# Patient Record
Sex: FEMALE | Race: WHITE | Employment: OTHER | URBAN - METROPOLITAN AREA
[De-identification: names, ages, dates, MRNs, and addresses within clinical notes are randomized per-mention and may not be internally consistent; named-entity substitution may affect disease eponyms.]

---

## 2017-04-13 ENCOUNTER — HOSPITAL ENCOUNTER (OUTPATIENT)
Dept: MRI IMAGING | Age: 62
Discharge: HOME | End: 2017-04-13
Attending: INTERNAL MEDICINE
Payer: COMMERCIAL

## 2017-04-13 ENCOUNTER — HOSPITAL ENCOUNTER (OUTPATIENT)
Dept: MAMMOGRAPHY | Age: 62
Discharge: HOME | End: 2017-04-13
Attending: INTERNAL MEDICINE
Payer: COMMERCIAL

## 2017-04-13 VITALS — BODY MASS INDEX: 28.98 KG/M2 | WEIGHT: 185 LBS

## 2017-04-13 DIAGNOSIS — C50.912 MALIGNANT NEOPLASM OF LEFT FEMALE BREAST, UNSPECIFIED SITE OF BREAST: ICD-10-CM

## 2017-04-13 LAB — CREAT BLD-MCNC: 0.9 MG/DL (ref 0.6–1)

## 2017-04-13 PROCEDURE — A9577 INJ MULTIHANCE: HCPCS | Performed by: INTERNAL MEDICINE

## 2017-04-13 PROCEDURE — 77059 MRI BREAST BI W WO CONT: CPT

## 2017-04-13 PROCEDURE — 74011250636 HC RX REV CODE- 250/636: Performed by: INTERNAL MEDICINE

## 2017-04-13 PROCEDURE — 82565 ASSAY OF CREATININE: CPT

## 2017-04-13 PROCEDURE — 74011000258 HC RX REV CODE- 258: Performed by: INTERNAL MEDICINE

## 2017-04-13 PROCEDURE — 77067 SCR MAMMO BI INCL CAD: CPT

## 2017-04-13 RX ORDER — SODIUM CHLORIDE 0.9 % (FLUSH) 0.9 %
10 SYRINGE (ML) INJECTION
Status: COMPLETED | OUTPATIENT
Start: 2017-04-13 | End: 2017-04-13

## 2017-04-13 RX ADMIN — SODIUM CHLORIDE 100 ML: 900 INJECTION, SOLUTION INTRAVENOUS at 08:35

## 2017-04-13 RX ADMIN — Medication 10 ML: at 08:35

## 2017-04-13 RX ADMIN — GADOBENATE DIMEGLUMINE 17 ML: 529 INJECTION, SOLUTION INTRAVENOUS at 08:35

## 2017-08-08 ENCOUNTER — HOSPITAL ENCOUNTER (OUTPATIENT)
Dept: LAB | Age: 62
Discharge: HOME | End: 2017-08-08
Payer: COMMERCIAL

## 2017-08-08 DIAGNOSIS — C50.912 MALIGNANT NEOPLASM OF LEFT FEMALE BREAST, UNSPECIFIED SITE OF BREAST: Chronic | ICD-10-CM

## 2017-08-08 LAB
ALBUMIN SERPL BCP-MCNC: 3.7 G/DL (ref 3.2–4.6)
ALBUMIN/GLOB SERPL: 0.9 {RATIO} (ref 1.2–3.5)
ALP SERPL-CCNC: 97 U/L (ref 50–136)
ALT SERPL-CCNC: 24 U/L (ref 12–65)
ANION GAP BLD CALC-SCNC: 5 MMOL/L (ref 7–16)
AST SERPL W P-5'-P-CCNC: 26 U/L (ref 15–37)
BASOPHILS # BLD AUTO: 0.1 K/UL (ref 0–0.2)
BASOPHILS # BLD: 1 % (ref 0–2)
BILIRUB SERPL-MCNC: 0.5 MG/DL (ref 0.2–1.1)
BUN SERPL-MCNC: 27 MG/DL (ref 8–23)
CALCIUM SERPL-MCNC: 8.9 MG/DL (ref 8.3–10.4)
CHLORIDE SERPL-SCNC: 107 MMOL/L (ref 98–107)
CO2 SERPL-SCNC: 23 MMOL/L (ref 21–32)
CREAT SERPL-MCNC: 0.84 MG/DL (ref 0.6–1)
DIFFERENTIAL METHOD BLD: ABNORMAL
EOSINOPHIL # BLD: 0.2 K/UL (ref 0–0.8)
EOSINOPHIL NFR BLD: 3 % (ref 0.5–7.8)
ERYTHROCYTE [DISTWIDTH] IN BLOOD BY AUTOMATED COUNT: 16.2 % (ref 11.9–14.6)
GLOBULIN SER CALC-MCNC: 4 G/DL (ref 2.3–3.5)
GLUCOSE SERPL-MCNC: 104 MG/DL (ref 65–100)
HCT VFR BLD AUTO: 39 % (ref 35.8–46.3)
HGB BLD-MCNC: 12.7 G/DL (ref 11.7–15.4)
LYMPHOCYTES # BLD AUTO: 30 % (ref 13–44)
LYMPHOCYTES # BLD: 2.1 K/UL (ref 0.5–4.6)
MCH RBC QN AUTO: 25.6 PG (ref 26.1–32.9)
MCHC RBC AUTO-ENTMCNC: 32.6 G/DL (ref 31.4–35)
MCV RBC AUTO: 78.5 FL (ref 79.6–97.8)
MONOCYTES # BLD: 0.5 K/UL (ref 0.1–1.3)
MONOCYTES NFR BLD AUTO: 8 % (ref 4–12)
NEUTS SEG # BLD: 4.1 K/UL (ref 1.7–8.2)
NEUTS SEG NFR BLD AUTO: 59 % (ref 43–78)
NRBC # BLD: 0 K/UL (ref 0–0.2)
PLATELET # BLD AUTO: 331 K/UL (ref 150–450)
PMV BLD AUTO: 10.1 FL (ref 10.8–14.1)
POTASSIUM SERPL-SCNC: 4.4 MMOL/L (ref 3.5–5.1)
PROT SERPL-MCNC: 7.7 G/DL (ref 6.3–8.2)
RBC # BLD AUTO: 4.97 M/UL (ref 4.05–5.25)
SODIUM SERPL-SCNC: 135 MMOL/L (ref 136–145)
WBC # BLD AUTO: 7 K/UL (ref 4.3–11.1)

## 2017-08-08 PROCEDURE — 80053 COMPREHEN METABOLIC PANEL: CPT | Performed by: INTERNAL MEDICINE

## 2017-08-08 PROCEDURE — 36415 COLL VENOUS BLD VENIPUNCTURE: CPT | Performed by: INTERNAL MEDICINE

## 2017-08-08 PROCEDURE — 85025 COMPLETE CBC W/AUTO DIFF WBC: CPT | Performed by: INTERNAL MEDICINE

## 2018-08-16 ENCOUNTER — HOSPITAL ENCOUNTER (OUTPATIENT)
Dept: LAB | Age: 63
Discharge: HOME | End: 2018-08-16
Payer: COMMERCIAL

## 2018-08-16 DIAGNOSIS — Z85.3 HISTORY OF CANCER OF LEFT BREAST: ICD-10-CM

## 2018-08-16 LAB
ALBUMIN SERPL-MCNC: 3.5 G/DL (ref 3.2–4.6)
ALBUMIN/GLOB SERPL: 0.9 {RATIO} (ref 1.2–3.5)
ALP SERPL-CCNC: 96 U/L (ref 50–136)
ALT SERPL-CCNC: 30 U/L (ref 12–65)
ANION GAP SERPL CALC-SCNC: 10 MMOL/L (ref 7–16)
AST SERPL-CCNC: 23 U/L (ref 15–37)
BASOPHILS # BLD: 0.1 K/UL (ref 0–0.2)
BASOPHILS NFR BLD: 1 % (ref 0–2)
BILIRUB SERPL-MCNC: 0.4 MG/DL (ref 0.2–1.1)
BUN SERPL-MCNC: 17 MG/DL (ref 8–23)
CALCIUM SERPL-MCNC: 8.3 MG/DL (ref 8.3–10.4)
CHLORIDE SERPL-SCNC: 107 MMOL/L (ref 98–107)
CO2 SERPL-SCNC: 22 MMOL/L (ref 21–32)
CREAT SERPL-MCNC: 0.88 MG/DL (ref 0.6–1)
DIFFERENTIAL METHOD BLD: ABNORMAL
EOSINOPHIL # BLD: 0.2 K/UL (ref 0–0.8)
EOSINOPHIL NFR BLD: 3 % (ref 0.5–7.8)
ERYTHROCYTE [DISTWIDTH] IN BLOOD BY AUTOMATED COUNT: 16.1 % (ref 11.9–14.6)
GLOBULIN SER CALC-MCNC: 3.9 G/DL (ref 2.3–3.5)
GLUCOSE SERPL-MCNC: 106 MG/DL (ref 65–100)
HCT VFR BLD AUTO: 44.9 % (ref 35.8–46.3)
HGB BLD-MCNC: 14.4 G/DL (ref 11.7–15.4)
IMM GRANULOCYTES # BLD: 0 K/UL (ref 0–0.5)
IMM GRANULOCYTES NFR BLD AUTO: 0 % (ref 0–5)
LYMPHOCYTES # BLD: 2 K/UL (ref 0.5–4.6)
LYMPHOCYTES NFR BLD: 28 % (ref 13–44)
MCH RBC QN AUTO: 27.2 PG (ref 26.1–32.9)
MCHC RBC AUTO-ENTMCNC: 32.1 G/DL (ref 31.4–35)
MCV RBC AUTO: 84.9 FL (ref 79.6–97.8)
MONOCYTES # BLD: 0.6 K/UL (ref 0.1–1.3)
MONOCYTES NFR BLD: 8 % (ref 4–12)
NEUTS SEG # BLD: 4.3 K/UL (ref 1.7–8.2)
NEUTS SEG NFR BLD: 60 % (ref 43–78)
NRBC # BLD: 0 K/UL (ref 0–0.2)
PLATELET # BLD AUTO: 286 K/UL (ref 150–450)
PMV BLD AUTO: 10.2 FL (ref 9.4–12.3)
POTASSIUM SERPL-SCNC: 4.7 MMOL/L (ref 3.5–5.1)
PROT SERPL-MCNC: 7.4 G/DL (ref 6.3–8.2)
RBC # BLD AUTO: 5.29 M/UL (ref 4.05–5.25)
SODIUM SERPL-SCNC: 139 MMOL/L (ref 136–145)
WBC # BLD AUTO: 7.2 K/UL (ref 4.3–11.1)

## 2018-08-16 PROCEDURE — 85025 COMPLETE CBC W/AUTO DIFF WBC: CPT

## 2018-08-16 PROCEDURE — 36415 COLL VENOUS BLD VENIPUNCTURE: CPT

## 2018-08-16 PROCEDURE — 80053 COMPREHEN METABOLIC PANEL: CPT

## 2018-09-15 ENCOUNTER — HOSPITAL ENCOUNTER (OUTPATIENT)
Dept: MAMMOGRAPHY | Age: 63
Discharge: HOME | End: 2018-09-15
Attending: INTERNAL MEDICINE
Payer: COMMERCIAL

## 2018-09-15 DIAGNOSIS — Z12.31 VISIT FOR SCREENING MAMMOGRAM: ICD-10-CM

## 2018-09-15 PROCEDURE — 77063 BREAST TOMOSYNTHESIS BI: CPT

## 2019-09-25 ENCOUNTER — HOSPITAL ENCOUNTER (OUTPATIENT)
Dept: MAMMOGRAPHY | Age: 64
Discharge: HOME | End: 2019-09-25
Attending: OBSTETRICS & GYNECOLOGY
Payer: COMMERCIAL

## 2019-09-25 DIAGNOSIS — Z12.39 SCREENING FOR BREAST CANCER: ICD-10-CM

## 2019-09-25 DIAGNOSIS — Z01.419 WELL WOMAN EXAM WITH ROUTINE GYNECOLOGICAL EXAM: ICD-10-CM

## 2019-09-25 PROCEDURE — 77067 SCR MAMMO BI INCL CAD: CPT

## 2020-09-30 ENCOUNTER — HOSPITAL ENCOUNTER (OUTPATIENT)
Dept: MAMMOGRAPHY | Age: 65
Discharge: HOME | End: 2020-09-30
Attending: INTERNAL MEDICINE
Payer: COMMERCIAL

## 2020-09-30 DIAGNOSIS — Z12.31 OTHER SCREENING MAMMOGRAM: ICD-10-CM

## 2020-09-30 PROCEDURE — 77067 SCR MAMMO BI INCL CAD: CPT

## 2021-04-16 ENCOUNTER — HOSPITAL ENCOUNTER (OUTPATIENT)
Dept: LAB | Age: 66
Discharge: HOME | End: 2021-04-16
Attending: INTERNAL MEDICINE

## 2021-04-16 LAB
ALBUMIN SERPL-MCNC: 3.8 G/DL (ref 3.2–4.6)
ALBUMIN/GLOB SERPL: 1 {RATIO} (ref 1.2–3.5)
ALP SERPL-CCNC: 97 U/L (ref 50–136)
ALT SERPL-CCNC: 28 U/L (ref 12–65)
ANION GAP SERPL CALC-SCNC: 4 MMOL/L (ref 7–16)
AST SERPL-CCNC: 20 U/L (ref 15–37)
BASOPHILS # BLD: 0.1 K/UL (ref 0–0.2)
BASOPHILS NFR BLD: 1 % (ref 0–2)
BILIRUB SERPL-MCNC: 0.5 MG/DL (ref 0.2–1.1)
BUN SERPL-MCNC: 19 MG/DL (ref 8–23)
CALCIUM SERPL-MCNC: 9.1 MG/DL (ref 8.3–10.4)
CHLORIDE SERPL-SCNC: 106 MMOL/L (ref 98–107)
CHOLEST SERPL-MCNC: 210 MG/DL
CO2 SERPL-SCNC: 26 MMOL/L (ref 21–32)
CREAT SERPL-MCNC: 0.64 MG/DL (ref 0.6–1)
DIFFERENTIAL METHOD BLD: NORMAL
EOSINOPHIL # BLD: 0.2 K/UL (ref 0–0.8)
EOSINOPHIL NFR BLD: 3 % (ref 0.5–7.8)
ERYTHROCYTE [DISTWIDTH] IN BLOOD BY AUTOMATED COUNT: 13.9 % (ref 11.9–14.6)
GLOBULIN SER CALC-MCNC: 3.9 G/DL (ref 2.3–3.5)
GLUCOSE SERPL-MCNC: 104 MG/DL (ref 65–100)
HCT VFR BLD AUTO: 45.1 % (ref 35.8–46.3)
HDLC SERPL-MCNC: 58 MG/DL (ref 40–60)
HDLC SERPL: 3.6 {RATIO}
HGB BLD-MCNC: 14.6 G/DL (ref 11.7–15.4)
IMM GRANULOCYTES # BLD AUTO: 0 K/UL (ref 0–0.5)
IMM GRANULOCYTES NFR BLD AUTO: 0 % (ref 0–5)
LDLC SERPL CALC-MCNC: 129 MG/DL
LIPID PROFILE,FLP: ABNORMAL
LYMPHOCYTES # BLD: 2.7 K/UL (ref 0.5–4.6)
LYMPHOCYTES NFR BLD: 36 % (ref 13–44)
MCH RBC QN AUTO: 28.2 PG (ref 26.1–32.9)
MCHC RBC AUTO-ENTMCNC: 32.4 G/DL (ref 31.4–35)
MCV RBC AUTO: 87.2 FL (ref 79.6–97.8)
MONOCYTES # BLD: 0.6 K/UL (ref 0.1–1.3)
MONOCYTES NFR BLD: 8 % (ref 4–12)
NEUTS SEG # BLD: 3.9 K/UL (ref 1.7–8.2)
NEUTS SEG NFR BLD: 52 % (ref 43–78)
NRBC # BLD: 0 K/UL (ref 0–0.2)
PLATELET # BLD AUTO: 320 K/UL (ref 150–450)
PMV BLD AUTO: 10.6 FL (ref 9.4–12.3)
POTASSIUM SERPL-SCNC: 4.4 MMOL/L (ref 3.5–5.1)
PROT SERPL-MCNC: 7.7 G/DL (ref 6.3–8.2)
RBC # BLD AUTO: 5.17 M/UL (ref 4.05–5.2)
SODIUM SERPL-SCNC: 136 MMOL/L (ref 136–145)
TRIGL SERPL-MCNC: 115 MG/DL (ref 35–150)
VLDLC SERPL CALC-MCNC: 23 MG/DL (ref 6–23)
WBC # BLD AUTO: 7.6 K/UL (ref 4.3–11.1)

## 2021-04-16 PROCEDURE — 80053 COMPREHEN METABOLIC PANEL: CPT

## 2021-04-16 PROCEDURE — 80061 LIPID PANEL: CPT

## 2021-04-16 PROCEDURE — 85025 COMPLETE CBC W/AUTO DIFF WBC: CPT

## 2021-04-16 PROCEDURE — 36415 COLL VENOUS BLD VENIPUNCTURE: CPT

## 2021-10-02 ENCOUNTER — HOSPITAL ENCOUNTER (OUTPATIENT)
Dept: MAMMOGRAPHY | Age: 66
Discharge: HOME | End: 2021-10-02
Attending: OBSTETRICS & GYNECOLOGY
Payer: MEDICARE

## 2021-10-02 DIAGNOSIS — Z12.31 VISIT FOR SCREENING MAMMOGRAM: ICD-10-CM

## 2021-10-02 PROCEDURE — 77067 SCR MAMMO BI INCL CAD: CPT

## 2022-02-16 ENCOUNTER — HOSPITAL ENCOUNTER (OUTPATIENT)
Dept: PHYSICAL THERAPY | Age: 67
Discharge: HOME | End: 2022-02-16
Attending: PHYSICIAN ASSISTANT
Payer: MEDICARE

## 2022-02-16 DIAGNOSIS — M25.511 RIGHT SHOULDER PAIN, UNSPECIFIED CHRONICITY: ICD-10-CM

## 2022-02-16 DIAGNOSIS — M19.011 PRIMARY OSTEOARTHRITIS OF RIGHT SHOULDER: ICD-10-CM

## 2022-02-16 PROCEDURE — 97110 THERAPEUTIC EXERCISES: CPT

## 2022-02-16 PROCEDURE — 97162 PT EVAL MOD COMPLEX 30 MIN: CPT

## 2022-02-16 PROCEDURE — 97140 MANUAL THERAPY 1/> REGIONS: CPT

## 2022-02-16 NOTE — PROGRESS NOTES
Regina Dominguez Sharda  : 1955  Primary: Glenroy Carranza Medicare Advantage  Secondary:  [address]  at [place] [id number] [facility], [address] [place] [address],[address] [place] [address].  [phone]   [phone]         OUTPATIENT PHYSICAL THERAPY: Daily Treatment Note 2022  Visit Count:  1    ICD-10: Treatment Diagnosis: Pain in right shoulder (M25.511)  Precautions/Allergies:   Ciprofloxacin, Levaquin [levofloxacin], and Pcn [penicillins]   TREATMENT PLAN:  Effective Dates: 2022 TO 2022 (90 days). Frequency/Duration: 2 times a week for 90 Day(s)    Pre-treatment Symptoms/Complaints:  R shoulder pain, weakness and decreased ROM  Pain: Initial:   0-1/10 Post Session:  0-1/10   Medications Last Reviewed:  2022  Updated Objective Findings:  See evaluation note from today  TREATMENT:     THERAPEUTIC EXERCISE: (15 minutes):  Exercises per grid below to improve mobility and strength. Required minimal verbal cues to promote proper body alignment and promote proper body posture. Progressed resistance and repetitions as indicated. MANUAL THERAPY: (10 minutes): PROM R shoulder into flexion, abduction, internal rotation and external rotation was utilized and necessary because of the patient's restricted joint motion. Date:  22 Date:   Date:     Activity/Exercise Parameters Parameters Parameters   Wand Flexion in Supine 10 reps  5 sec holds     Wand External Rotation in Supine 10 reps  5 sec holds     Pendulum Exercises (Circles CW/CCW) 20 reps each     Shoulder Pulley into Flexion 20 reps  5 sec holds                           [employer] Portal  Treatment/Session Summary:    · Response to Treatment:  Pt tolerated all treatments well today with min c/o R shoulder pain.   · Communication/Consultation:  None today  · Equipment provided today:  None today  · Recommendations/Intent for next treatment session: Next visit will focus on progression of ROM/strengthening R shoulder as tolerable.     Total Treatment Billable Duration:  25 minutes  PT Patient Time In/Time Out  Time In: 1345  Time Out: 200 [facility], PT   Visit # Therapist initials Date Arrived NS/ Cx < 24 hr >24 hr Cx Visit Comments   1  02-16-22 X   Initial Assessment and Treatment                                                                                                                                                              Abbreviations: NS = No Show; CX = cancelled      Future Appointments   Date Time Provider Charity Tracey   2/22/2022  8:00 AM Cj Palm, PTA [facility]   2/24/2022  8:00 AM Cj Palm, PTA [facility]   3/1/2022  8:00 AM Cj Palm, PTA [facility]   3/3/2022  8:00 AM Ayaka Chimes, PT [facility]   3/8/2022  8:00 AM Cj Palm, PTA [facility]   3/10/2022  8:00 AM Cj Palm, PTA [facility]   3/15/2022  8:00 AM Cj Palm, PTA [facility]   3/17/2022  8:00 AM Ayaka Chimes, PT [facility]   4/21/2022  8:10 AM MAT LAB YANETH MAT [facility]   5/3/2022  8:20 AM MD YANETH Barr [facility]

## 2022-02-16 NOTE — THERAPY EVALUATION
Regina Robin  : 1955  Primary: Glenroy Carranza Medicare Advantage  Secondary:  [address] at [place] [address] [facility], [address] [place] [address],[address], [place] [address].  [phone]   [phone]           Jordi Pugh Assessment 2022   ICD-10: Treatment Diagnosis: Pain in right shoulder (M25.511)  Precautions/Allergies:   Ciprofloxacin, Levaquin [levofloxacin], and Pcn [penicillins]   TREATMENT PLAN:  Effective Dates: 2022 TO 2022 (90 days). Frequency/Duration: 2 times a week for 90 Day(s) MEDICAL/REFERRING DIAGNOSIS:  Right shoulder pain, unspecified chronicity [M25.511]  Primary osteoarthritis of right shoulder [M19.011]   DATE OF ONSET: Chronic R shoulder pain  REFERRING PHYSICIAN: DONATO Diego MD Orders: Evaluate and Treat  Return MD Appointment: Pt does not have a follow-up appt at this time     INITIAL ASSESSMENT:  Ms. Alberto Robin presents with decreased R shoulder ROM, decreased R shoulder strength and increased pain leading to decreased functional status. Pt would benefit from skilled physical therapy services to address the above deficits and help patient return to prior level of function. PROBLEM LIST (Impacting functional limitations):  1. Decreased Strength  2. Decreased ADL/Functional Activities  3. Increased Pain  4. Decreased Flexibility/Joint Mobility  5. Decreased [place] with Home Exercise Program INTERVENTIONS PLANNED: (Treatment may consist of any combination of the following)  1. Cold  2. Cryotherapy  3. Electrical Stimulation  4. Heat  5. Home Exercise Program (HEP)  6. Manual Therapy  7. Range of Motion (ROM)  8. Therapeutic Exercise/Strengthening  9. Ultrasound (US)     GOALS: (Goals have been discussed and agreed upon with patient.)  Short-Term Functional Goals: Time Frame: 4 weeks  1.  Pt will increase ROM R shoulder flexion = 140 degrees, abduction = 140 degrees, internal rotation = 50 degrees, external rotation = 60 degrees to assist with daily activities  2. Pt will increase strength R shoulder 4-/5 to assist with daily activities  3. Pt will be independent with HEP  Discharge Goals: Time Frame: 12 weeks  1. Pt will increase ROM R shoulder flexion = 160 degrees, abduction = 160 degrees, internal rotation = 70 degrees, external rotation = 80 degrees to assist with daily activities  2. Pt will increase strength R shoulder 4/5 to assist with daily activities  3. Pt will sleep 6 hours without awakening due to R shoulder pain    OUTCOME MEASURE:   Tool Used: Disabilities of the Arm, Shoulder and Hand (DASH) Questionnaire - Quick Version  Score:  Initial: 18/55  Most Recent: X/55 (Date: -- )   Interpretation of Score: The DASH is designed to measure the activities of daily living in person's with upper extremity dysfunction or pain. Each section is scored on a 1-5 scale, 5 representing the greatest disability. The scores of each section are added together for a total score of 55. MEDICAL NECESSITY:   · Patient is expected to demonstrate progress in strength, range of motion and functional technique to increase independence with daily activities. · Patient demonstrates good rehab potential due to higher previous functional level. REASON FOR SERVICES/OTHER COMMENTS:  · Patient continues to require skilled intervention due to decreased ROM/strength R shoulder with increased pain leading to decreased functional status. Total Duration:  PT Patient Time In/Time Out  Time In: 1345  Time Out: 1430    Rehabilitation Potential For Stated Goals: Good  Regarding Regina Gross's therapy, I certify that the treatment plan above will be carried out by a therapist or under their direction.   Thank you for this referral,  Bennie Martell, PT     Referring Physician Signature: DONATO Connors _______________________________ Date _____________      PAIN/SUBJECTIVE:   Initial:   0-1/10 Post Session:  0-1/10   HISTORY: History of Injury/Illness (Reason for Referral):  Pt reports insidious onset R shoulder pain since college. She states she tore a tendon in her shoulder when swimming. She states she did not have any surgery. Pt states she had a R frozen shoulder years ago also. Pt had x-rays of her R shoulder which revealed arthritis. Pt states MD did not mention shoulder replacement surgery at this time. She reports difficulties reaching overhead and behind her back. She rates her current R shoulder pain 0-1/10 sitting at rest, increasing to 3-4/10 at worst over the past week. Aggravating factors include use of R UE and sleeping on her R side. Relieving factors include heat and IcyHot. She is R-handed. She denies any neck pain or UE numbness/tingling. Pt has not had any treatment thus far for her R shoulder. Pt works in finances/insurance (mostly desk job). Pt states she has had some acupuncture recently with some benefit in ROM. Pt meets with a  3 times a week. Past Medical History/Comorbidities:   Ms. Andrez Davila  has a past medical history of Aphagia, Arthritis, Breast cancer ([address] Cancer ([address] (3/10), Chemotherapy convalescence or palliative care, GERD (gastroesophageal reflux disease), Hypertension, Radiation therapy complication, S/P prosthetic total arthroplasty of the hip, Thromboembolus ([address] and Thyroid disease. She has no past medical history of Adverse effect of anesthesia, Aneurysm ([address] Coagulation defects, COPD, Difficult intubation, Heart failure ([address] Malignant hyperthermia due to anesthesia, Morbid obesity ([address] Nausea & vomiting, Other ill-defined conditions(799.89), Pseudocholinesterase deficiency, Psychiatric disorder, Unspecified adverse effect of anesthesia, or Unspecified sleep apnea. Ms. Andrez Davila  has a past surgical history that includes hx vascular access (2012); hx heent (3/2011); hx laparotomy (1993); vascular access placement (7/2011); hx dilation and curettage (2009); hx heart catheterization (5/28/13); hx breast lumpectomy (4/2011); hx cholecystectomy (6/18/13); hx hip replacement (12/2015); and hx hip replacement (12/13/2011). Social History/Living Environment:     Pt currently having difficulties with heavier household ADL's and fastening her bra due to decreased ROM R shoulder  Prior Level of Function/Work/Activity:  Pt works in finances/insurance  Dominant Side:         RIGHT  Other Clinical Tests:          X-rays R shoulder revealed arthritis  Personal Factors:          Sex:  female        Age:  73 y.o. Profession:  Pt works in finances/insurance   Ambulatory/Rehab [address] Assessment   Risk Factors:       No Risk Factors Identified Ability to Rise from Chair:       (0)  Ability to rise in a single movement   Falls Prevention Plan:       No modifications necessary   Total: (5 or greater = High Risk): 0   ©2010 [facility] of [facility] . [facility] States Patent #[id number]. Federal Law prohibits the replication, distribution or use without written permission from [facility] of [employer]   Current Medications:       Current Outpatient Medications:     diclofenac (VOLTAREN) 1 % gel, Apply  to affected area four (4) times daily. , Disp: 100 g, Rfl: 0    liothyronine (CYTOMEL) 5 mcg tablet, Take 5 mcg by mouth daily. , Disp: , Rfl:     LORazepam (ATIVAN) 0.5 mg tablet, Take 1 Tablet by mouth every six (6) hours as needed for Anxiety. , Disp: 60 Tablet, Rfl: 5    levothyroxine (Synthroid) 75 mcg tablet, Take 1 Tablet by mouth Daily (before breakfast). , Disp: 90 Tablet, Rfl: 3    OTHER,NON-FORMULARY,, T3 5 mcg daily, Disp: 90 Each, Rfl: 3    OTHER, daily. Tumeric 900 mg cap, Disp: , Rfl:     omega-3 fatty acids-vitamin e 1,000 mg cap, Take 1 Cap by mouth daily. , Disp: , Rfl:     CALCIUM CARBONATE/VITAMIN D3 (CALCIUM 600 + D,3, PO), Take 1 Tab by mouth two (2) times a day., Disp: , Rfl:     diphenhydrAMINE (BENADRYL) 25 mg capsule, Take 50 mg by mouth nightly as needed. , Disp: , Rfl:    Date Last Reviewed:  02-16-22   Number of Personal Factors/Comorbidities that affect the Plan of Care: 1-2: MODERATE COMPLEXITY   EXAMINATION:   Observation/Orthostatic Postural Assessment: Forward head, rounded shoulders  Palpation:          Tenderness R anterior/lateral deltoid, bicep tendon, subscapularis and lats  ROM:    R shoulder flexion = 120 degrees  R shoulder abduction = 120 degrees  R shoulder internal rotation = 30 degrees  R shoulder external rotation = 40 degrees    L shoulder flexion = 170 degrees  L shoulder abduction = 170 degrees  L shoulder internal rotation = 80 degrees  L shoulder external rotation = 90 degrees    Strength:    R shoulder flexion = 3+/5  R shoulder abduction = 3+/5  R shoulder internal rotation = 4-/5  R shoulder external rotation = 4-/5    L shoulder flexion = 5/5  L shoulder abduction = 5/5  L shoulder internal rotation = 5/5  L shoulder external rotation = 5/5    Special Tests:          Hawkin's Segundo aggravates R shoulder symptoms  Neurological Screen:        Sensation: Intact to light touch R UE  Functional Mobility:         Transfers:  Pt able to transition sit to supine and supine to sit independently    Body Structures Involved:  1. Bones  2. Joints  3. Muscles Body Functions Affected:  1. Sensory/Pain  2. Neuromusculoskeletal Activities and Participation Affected:  1. Mobility  2.  Domestic Life   Number of elements (examined above) that affect the Plan of Care: 3: MODERATE COMPLEXITY   CLINICAL PRESENTATION:   Presentation: Evolving clinical presentation with changing clinical characteristics: MODERATE COMPLEXITY   CLINICAL DECISION MAKING:   Use of outcome tool(s) and clinical judgement create a POC that gives a: Questionable prediction of patient's progress: MODERATE COMPLEXITY

## 2022-02-22 ENCOUNTER — HOSPITAL ENCOUNTER (OUTPATIENT)
Dept: PHYSICAL THERAPY | Age: 67
Discharge: HOME | End: 2022-02-22
Attending: PHYSICIAN ASSISTANT
Payer: MEDICARE

## 2022-02-22 PROCEDURE — 97140 MANUAL THERAPY 1/> REGIONS: CPT

## 2022-02-22 PROCEDURE — 97110 THERAPEUTIC EXERCISES: CPT

## 2022-02-22 NOTE — PROGRESS NOTES
Regina WESLEY Emmanuelle Robert  : 1955  Primary: Evan Boswell Medicare Advantage  Secondary:  [address]  at [facility]  [address] [facility], [address] [place] [address],[address], [address] [facility]  [phone]   [phone]         OUTPATIENT PHYSICAL THERAPY: Daily Treatment Note 2022  Visit Count:  2    ICD-10: Treatment Diagnosis: Pain in right shoulder (M25.511)  Precautions/Allergies:   Ciprofloxacin, Levaquin [levofloxacin], and Pcn [penicillins]   TREATMENT PLAN:  Effective Dates: 2022 TO 2022 (90 days). Frequency/Duration: 2 times a week for 90 Day(s)    Pre-treatment Symptoms/Complaints:  R shoulder pain, weakness and decreased ROM  \"It fires up at times, then releases it\"  Pain: Initial:   0-1/10 Post Session:  0-1/10   Medications Last Reviewed:  2022  Updated Objective Findings:  None Today  TREATMENT:     THERAPEUTIC EXERCISE: (25 minutes):  Exercises per grid below to improve mobility and strength. Required minimal verbal cues to promote proper body alignment and promote proper body posture. Progressed resistance and repetitions as indicated. MANUAL THERAPY: (20 minutes): PROM R shoulder into flexion, abduction, internal rotation and external rotation was utilized and necessary because of the patient's restricted joint motion, loss of articular motion and restricted motion of soft tissue. Worked through Right bicep secondary to tightness and discomfort with bio-freeze.        Date:  22 Date:   Date:     Activity/Exercise Parameters Parameters Parameters   Wand Flexion in Supine 10 reps  5 sec holds     Wand External Rotation in Supine 10 reps  5 sec holds     Pendulum Exercises (Circles CW/CCW) 20 reps each     Shoulder Pulley into Flexion-Scaption 20 reps  5 sec holds     Wall Walk X 10 reps     Wand ER 15 reps 5 sec holds     UBE 6 min Level 2.0     tband ER IR Red x 15 reps     tband Rows  tband extensions Blue x 15  Green x 15      Supine wand Push Ups X 20 reps [facility] Portal  Treatment/Session Summary:    · Response to Treatment:  Pt tolerated all treatments well today with min c/o R shoulder pain. Tightness into IR/ER with end range tightness with flexion and abduction. Right bicep tightness. · Communication/Consultation:  None today  · Equipment provided today:  None today  · Recommendations/Intent for next treatment session: Next visit will focus on progression of ROM/strengthening R shoulder as tolerable.     Total Treatment Billable Duration:  45 minutes  PT Patient Time In/Time Out  Time In: 0800  Time Out: [address], PTA   Visit # Therapist initials Date Arrived NS/ Cx < 24 hr >24 hr Cx Visit Comments   1 BW 02-16-22 X   Initial Assessment and Treatment   2 rjk 2-22-22 x                                                                                                                                                        Abbreviations: NS = No Show; CX = cancelled      Future Appointments   Date Time Provider Charity Webb   2/24/2022  8:00 AM [place] Danay, PTA [facility]   3/1/2022  8:00 AM Vaibhav Danay, PTA [facility]   3/3/2022  8:00 AM Donnamaria [place], PT [facility]   3/8/2022  8:00 AM [place] Lush, PTA [facility]   3/10/2022  8:00 AM [place] Danay, PTA [facility]   3/15/2022  8:00 AM [place] Danay, PTA [facility]   3/17/2022  8:00 AM Donnamaria [place], PT [facility]   4/21/2022  8:10 AM MAT LAB [facility]   5/3/2022  8:20 AM Jessica Funez MD [facility]

## 2022-02-24 ENCOUNTER — HOSPITAL ENCOUNTER (OUTPATIENT)
Dept: PHYSICAL THERAPY | Age: 67
Discharge: HOME | End: 2022-02-24
Attending: PHYSICIAN ASSISTANT
Payer: MEDICARE

## 2022-02-24 PROCEDURE — 97110 THERAPEUTIC EXERCISES: CPT

## 2022-02-24 PROCEDURE — 97140 MANUAL THERAPY 1/> REGIONS: CPT

## 2022-02-24 NOTE — PROGRESS NOTES
Regina WESLEY Jose Melendrez  : 1955  Primary: Chelsea Kevin Medicare Advantage  Secondary:  [address]  at [place] [id number] [facility], [address] [place] [address],[address], [place] [address].  [phone]   [phone]         OUTPATIENT PHYSICAL THERAPY: Daily Treatment Note 2022  Visit Count:  3    ICD-10: Treatment Diagnosis: Pain in right shoulder (M25.511)  Precautions/Allergies:   Ciprofloxacin, Levaquin [levofloxacin], and Pcn [penicillins]   TREATMENT PLAN:  Effective Dates: 2022 TO 2022 (90 days). Frequency/Duration: 2 times a week for 90 Day(s)    Pre-treatment Symptoms/Complaints:  R shoulder pain, weakness and decreased ROM  \"I am doing ok\" \"I had accupuncture- they shoot some stuff up in that shoulder, help grow cartilage, had it done yesterday so it is sore today\"  Pain: Initial:   -3/10 Post Session:  2/10   Medications Last Reviewed:  2022  Updated Objective Findings:  None Today  TREATMENT:     THERAPEUTIC EXERCISE: (25 minutes):  Exercises per grid below to improve mobility and strength. Required minimal verbal cues to promote proper body alignment and promote proper body posture. Progressed resistance and repetitions as indicated. MANUAL THERAPY: (20 minutes): PROM R shoulder into flexion, abduction, internal rotation and external rotation was utilized and necessary because of the patient's restricted joint motion, loss of articular motion and restricted motion of soft tissue. Worked through Right bicep secondary to tightness and discomfort with bio-freeze.        Date:  22 Date:   Date:     Activity/Exercise Parameters Parameters Parameters   Wand Flexion in Supine 10 reps  5 sec holds     Wand External Rotation in Supine 15 reps  5 sec holds     Pendulum Exercises (Circles CW/CCW) 20 reps each     Shoulder Pulley into Flexion-Scaption 20 reps  5 sec holds     Standing Flexion   Standing Scaption #1 x 15  #1 x 15     Wall Walk X 10 reps     Wand ER 15 reps 5 sec holds     UBE 6 min Level 2.0     tband ER IR Red x 15 reps     tband Rows  tband extensions Blue x 15  Green x 15      Supine wand Push Ups X 20 reps      Bicep curl #10 x 20 reps         [employer] Portal  Treatment/Session Summary:    · Response to Treatment:  Pt tolerated all treatments well today with min c/o R shoulder pain. Tightness into IR/ER with end range tightness with flexion and abduction. Right bicep tightness. Patient works out 4 times a week. · Communication/Consultation:  None today  · Equipment provided today:  None today  · Recommendations/Intent for next treatment session: Next visit will focus on progression of ROM/strengthening R shoulder as tolerable.     Total Treatment Billable Duration:  45 minutes  PT Patient Time In/Time Out  Time In: 0800  Time Out: [address], PTA   Visit # Therapist initials Date Arrived NS/ Cx < 24 hr >24 hr Cx Visit Comments   1  02-16-22 X   Initial Assessment and Treatment   2 [facility] 2-22-22 x      3 [facility] 2-24-22 x                                                                                                                                               Abbreviations: NS = No Show; CX = cancelled      Future Appointments   Date Time Provider Charity Webb   2/24/2022  8:00 AM Caren Rave, PTA [facility]   3/1/2022  8:00 AM Caren Rave, PTA [facility]   3/3/2022  8:00 AM Wilfrido Lowe, PT [facility]   3/8/2022  8:00 AM Caren Rave, PTA [facility]   3/10/2022  8:00 AM Caren Rave, PTA [facility]   3/15/2022  8:00 AM Caren Rave, PTA [facility]   3/17/2022  8:00 AM Wilfrido Saucedophrey, PT [facility]   4/21/2022  8:10 AM MAT LAB [facility]   5/3/2022  8:20 AM MD YANETH Tejada [facility]

## 2022-03-01 ENCOUNTER — HOSPITAL ENCOUNTER (OUTPATIENT)
Dept: PHYSICAL THERAPY | Age: 67
Discharge: HOME | End: 2022-03-01
Attending: PHYSICIAN ASSISTANT
Payer: MEDICARE

## 2022-03-01 PROCEDURE — 97110 THERAPEUTIC EXERCISES: CPT

## 2022-03-01 PROCEDURE — 97140 MANUAL THERAPY 1/> REGIONS: CPT

## 2022-03-01 NOTE — PROGRESS NOTES
Regina WESLEY Maritza Ellis  : 1955  Primary: Ashley Price Medicare Advantage  Secondary:  [address] at [address], [address] [place] [address],[address], [place] [address].  [phone]   [phone]         OUTPATIENT PHYSICAL THERAPY: Daily Treatment Note 3/1/2022  Visit Count:  4    ICD-10: Treatment Diagnosis: Pain in right shoulder (M25.511)  Precautions/Allergies:   Ciprofloxacin, Levaquin [levofloxacin], and Pcn [penicillins]   TREATMENT PLAN:  Effective Dates: 2022 TO 2022 (90 days). Frequency/Duration: 2 times a week for 90 Day(s)    Pre-treatment Symptoms/Complaints:  R shoulder pain, weakness and decreased ROM  \"It aches when I go out into ER of my shoulders, I worked out yesterday and afterwards it was achiness\"  Pain: Initial:   -3/10 Post Session:  2/10   Medications Last Reviewed:  3/1/2022  Updated Objective Findings:  None Today  TREATMENT:     THERAPEUTIC EXERCISE: (25 minutes):  Exercises per grid below to improve mobility and strength. Required minimal verbal cues to promote proper body alignment and promote proper body posture. Progressed resistance and repetitions as indicated. MANUAL THERAPY: (20 minutes): PROM R shoulder into flexion, abduction, internal rotation and external rotation was utilized and necessary because of the patient's restricted joint motion, loss of articular motion and restricted motion of soft tissue. Worked through Right bicep secondary to tightness and discomfort with bio-freeze.        Date:  22 Date:   Date:     Activity/Exercise Parameters Parameters Parameters   Wand Flexion in Supine 10 reps  5 sec holds     Wand External Rotation in Supine 15 reps  5 sec holds     Pendulum Exercises (Circles CW/CCW) 20 reps each     Shoulder Pulley into Flexion-Scaption 20 reps  5 sec holds           Wall Walk X 10 reps     Bent over Flexion  Bent over Scaption  Bent over Extension  Bent over Rows # 1 x 15 reps  # 1 x 15 reps  # 4 x 15 reps  # 10 x 15 reps                 Wand ER 15 reps 5 sec holds     UBE 6 min Level 3.0     tband ER IR Red x 15 reps     tband Rows  tband extensions Blue x 20  blue x 20=     Supine wand Push Ups X 20 reps      Bicep curl #10 x 20 reps         [employer] Portal  Treatment/Session Summary:    · Response to Treatment:  Pt tolerated all treatments well today with min c/o R shoulder pain. Tightness into IR/ER with end range tightness with flexion and abduction. Right bicep tightness. Patient having overall Ache in her shoulder, especially into ER. · Communication/Consultation:  None today  · Equipment provided today:  None today  · Recommendations/Intent for next treatment session: Next visit will focus on progression of ROM/strengthening R shoulder as tolerable.     Total Treatment Billable Duration:  45 minutes  PT Patient Time In/Time Out  Time In: 0800  Time Out: [address], PTA   Visit # Therapist initials Date Arrived NS/ Cx < 24 hr >24 hr Cx Visit Comments   1  02-16-22 X   Initial Assessment and Treatment   2 [facility] 2-22-22 x      3 [facility] 2-24-22 x      4 [facility] 3-1-22 x                                                                                                                                      Abbreviations: NS = No Show; CX = cancelled      Future Appointments   Date Time Provider Charity Webb   3/1/2022  8:00 AM Zeb Marilyn, PTA [facility]   3/3/2022  8:00 AM Angeles Hobson, PT [facility]   3/8/2022  8:00 AM Lovina Marilyn, PTA [facility]   3/10/2022  8:00 AM Lovina Marilyn, PTA [facility]   3/14/2022  8:00 AM Lovina Marilyn, PTA [facility]   3/16/2022  8:00 AM Lovina Marilyn, PTA [facility]   4/21/2022  8:10 AM MAT LAB [facility]   5/3/2022  8:20 AM MD YANETH Hay [facility]

## 2022-03-03 ENCOUNTER — HOSPITAL ENCOUNTER (OUTPATIENT)
Dept: PHYSICAL THERAPY | Age: 67
Discharge: HOME | End: 2022-03-03
Attending: PHYSICIAN ASSISTANT
Payer: MEDICARE

## 2022-03-03 PROCEDURE — 97110 THERAPEUTIC EXERCISES: CPT

## 2022-03-03 PROCEDURE — 97140 MANUAL THERAPY 1/> REGIONS: CPT

## 2022-03-03 NOTE — PROGRESS NOTES
Regina Banks Moris  : 1955  Primary: Myron [place] Medicare Advantage  Secondary:  [address]  at [place] [address] [facility], [address] [place] [address],[address], [address] [facility]  [phone]   [phone]         OUTPATIENT PHYSICAL THERAPY: Daily Treatment Note 3/3/2022  Visit Count:  5    ICD-10: Treatment Diagnosis: Pain in right shoulder (M25.511)  Precautions/Allergies:   Ciprofloxacin, Levaquin [levofloxacin], and Pcn [penicillins]   TREATMENT PLAN:  Effective Dates: 2022 TO 2022 (90 days). Frequency/Duration: 2 times a week for 90 Day(s)    Pre-treatment Symptoms/Complaints:  R shoulder pain, weakness and decreased ROM;  Pt states she can tell her ROM is better. Pain: Initial:   -3/10 Post Session:  2/10   Medications Last Reviewed:  3/3/2022  Updated Objective Findings:  None Today  TREATMENT:     THERAPEUTIC EXERCISE: (25 minutes):  Exercises per grid below to improve mobility and strength. Required minimal verbal cues to promote proper body alignment and promote proper body posture. Progressed resistance and repetitions as indicated. MANUAL THERAPY: (20 minutes): PROM R shoulder into flexion, abduction, internal rotation and external rotation was utilized and necessary because of the patient's restricted joint motion, loss of articular motion and restricted motion of soft tissue. Date:  22 Date:   Date:     Activity/Exercise Parameters Parameters Parameters   Wand Flexion in Supine 10 reps  5 sec holds 10 reps  5 sec holds    Wand External Rotation in Supine 15 reps  5 sec holds 10 reps  5 sec holds    Pendulum Exercises (Circles CW/CCW) 20 reps each 20 reps each    Shoulder Pulley into Flexion-Scaption 20 reps  5 sec holds 20 reps  5 sec holds    Wall Walk X 10 reps 10 reps    Bent over Flexion  Bent over Scaption  Bent over Extension  Bent over Rows # 1 x 15 reps  # 1 x 15 reps  # 4 x 15 reps  # 10 x 15 reps Bent Over Shoulder Ext  3 Lbs  20 reps;   Rows  10 Lbs  20 reps    UBE 6 min Level 3.0 Manual L3  6 minutes    tband ER IR Red x 15 reps Red T-band  20 reps each    tband Rows  tband extensions Blue x 20  blue x 20= Blue T-band  20 reps each    Supine wand Push Ups X 20 reps      Bicep curl #10 x 20 reps 10 Lbs  20 reps        MedBridge Portal  Treatment/Session Summary:    · Response to Treatment:  Pt tolerated all treatments well today with min c/o R shoulder pain. Improved ROM R shoulder today. · Communication/Consultation:  None today  · Equipment provided today:  None today  · Recommendations/Intent for next treatment session: Next visit will focus on progression of ROM/strengthening R shoulder as tolerable.     Total Treatment Billable Duration:  45 minutes  PT Patient Time In/Time Out  Time In: 0800  Time Out: [address], PT   Visit # Therapist initials Date Arrived NS/ Cx < 24 hr >24 hr Cx Visit Comments   1  02-16-22 X   Initial Assessment and Treatment   2 rjk 2-22-22 x      3 [facility] 2-24-22 x      4 rjk 3-1-22 x      5  03-03-22 X                                                                                                                             Abbreviations: NS = No Show; CX = cancelled      Future Appointments   Date Time Provider Charity Webb   3/8/2022  8:00 AM Francia Acosta, PTA [facility]   3/10/2022  8:00 AM Francia Acosta, PTA [facility]   3/14/2022  8:00 AM Francia Acosta, PTA [facility]   3/16/2022  8:00 AM Francia Acosta, PTA [facility]   4/21/2022  8:10 AM MAT LAB [facility]   5/3/2022  8:20 AM Marta Epley, MD [facility]

## 2022-03-08 ENCOUNTER — HOSPITAL ENCOUNTER (OUTPATIENT)
Dept: PHYSICAL THERAPY | Age: 67
Discharge: HOME | End: 2022-03-08
Attending: PHYSICIAN ASSISTANT
Payer: MEDICARE

## 2022-03-08 PROCEDURE — 97140 MANUAL THERAPY 1/> REGIONS: CPT

## 2022-03-08 PROCEDURE — 97110 THERAPEUTIC EXERCISES: CPT

## 2022-03-08 NOTE — PROGRESS NOTES
Regina Chávez  : 1955  Primary: Prince Tapia Medicare Advantage  Secondary:  Katy Cyrashley at [place] [address] [facility], [address] [place] [address],[address], [place] [address].  [phone]   [phone]         OUTPATIENT PHYSICAL THERAPY: Daily Treatment Note 3/8/2022  Visit Count:  6    ICD-10: Treatment Diagnosis: Pain in right shoulder (M25.511)  Precautions/Allergies:   Ciprofloxacin, Levaquin [levofloxacin], and Pcn [penicillins]   TREATMENT PLAN:  Effective Dates: 2022 TO 2022 (90 days). Frequency/Duration: 2 times a week for 90 Day(s)    Pre-treatment Symptoms/Complaints:  R shoulder pain, weakness and decreased ROM; Achiness overall, I worked out yesterday and did a bent over row, with no pain'  Pain: Initial:   -3/10 Post Session:  2/10   Medications Last Reviewed:  3/8/2022  Updated Objective Findings:  None Today  TREATMENT:     THERAPEUTIC EXERCISE: (25 minutes):  Exercises per grid below to improve mobility and strength. Required minimal verbal cues to promote proper body alignment and promote proper body posture. Progressed resistance and repetitions as indicated. MANUAL THERAPY: (20 minutes): PROM R shoulder into flexion, abduction, internal rotation and external rotation was utilized and necessary because of the patient's restricted joint motion, loss of articular motion and restricted motion of soft tissue. Date:  3-8-22 Date:     Activity/Exercise Parameters Parameters   Wand Flexion in Supine 10 reps  5 sec holds    Nautilus Rows #45 x 20 reps    Wand External Rotation in Supine 10 reps  5 sec holds    Pendulum Exercises (Circles CW/CCW) 20 reps each    Shoulder Pulley into Flexion-Scaption 20 reps  5 sec holds    Bench Press #5 x 20 reps    Wall Walk 10 reps    Bent over Flexion  Bent over Scaption  Bent over Extension #5  Bent over Rows Bent Over Shoulder Ext  3 Lbs  20 reps;   Rows  17.5 Lbs  20 reps    Standing flexion - Scaption #1 x 15 reps    UBE Manual L3  6 minutes    tband ER IR green T-band  20 reps each    tband Rows  tband extensions Black T-band  20 reps each    Supine wand Push Ups x    Bicep curl 10 Lbs  20 reps        [employer] Portal  Treatment/Session Summary:    · Response to Treatment:  Pt tolerated all treatments well today with min c/o R shoulder pain. ER and IR tightness- patient continues to work out 3 x's a week with virtual . · Communication/Consultation:  None today  · Equipment provided today:  None today  · Recommendations/Intent for next treatment session: Next visit will focus on progression of ROM/strengthening R shoulder as tolerable.     Total Treatment Billable Duration:  45 minutes  PT Patient Time In/Time Out  Time In: 0800  Time Out: [address], PTA   Visit # Therapist initials Date Arrived NS/ Cx < 24 hr >24 hr Cx Visit Comments   1 BW 02-16-22 X   Initial Assessment and Treatment   2 rjk 2-22-22 x      3 rjk 2-24-22 x      4 rjk 3-1-22 x      5 BW 03-03-22 X      6 rjk 3-8-22 x                                                                                                                    Abbreviations: NS = No Show; CX = cancelled      Future Appointments   Date Time Provider Charity Webb   3/8/2022  8:00 AM Lawayne Christ, PTA [facility]   3/10/2022  8:00 AM Lawayne Christ, PTA [facility]   3/14/2022  8:00 AM Lawayne Christ, PTA [facility]   3/16/2022  8:00 AM Lawayne Christ, PTA [facility]   4/21/2022  8:10 AM MAT LAB [facility]   5/3/2022  8:20 AM Sheldon Upton MD [facility]

## 2022-03-10 ENCOUNTER — HOSPITAL ENCOUNTER (OUTPATIENT)
Dept: PHYSICAL THERAPY | Age: 67
Discharge: HOME | End: 2022-03-10
Attending: PHYSICIAN ASSISTANT
Payer: MEDICARE

## 2022-03-10 PROCEDURE — 97140 MANUAL THERAPY 1/> REGIONS: CPT

## 2022-03-10 PROCEDURE — 97110 THERAPEUTIC EXERCISES: CPT

## 2022-03-10 NOTE — PROGRESS NOTES
Regina WESLEY Peggye Cabot  : 1955  Primary: Terrance Castanon Medicare Advantage  Secondary:  [address]  at [place] [id number] [facility], [address] [place] [address],[address], [place] [address].  [phone]   [phone]         OUTPATIENT PHYSICAL THERAPY: Daily Treatment Note 3/10/2022  Visit Count:  7    ICD-10: Treatment Diagnosis: Pain in right shoulder (M25.511)  Precautions/Allergies:   Ciprofloxacin, Levaquin [levofloxacin], and Pcn [penicillins]   TREATMENT PLAN:  Effective Dates: 2022 TO 2022 (90 days). Frequency/Duration: 2 times a week for 90 Day(s)    Pre-treatment Symptoms/Complaints:  R shoulder pain, weakness and decreased ROM;  \"Just some twinges\"  Pain: Initial:   1/10 Post Session:  2/10   Medications Last Reviewed:  3/10/2022  Updated Objective Findings:  None Today  TREATMENT:     THERAPEUTIC EXERCISE: (25 minutes):  Exercises per grid below to improve mobility and strength. Required minimal verbal cues to promote proper body alignment and promote proper body posture. Progressed resistance and repetitions as indicated. MANUAL THERAPY: (20 minutes): PROM R shoulder into flexion, abduction, internal rotation and external rotation was utilized and necessary because of the patient's restricted joint motion, loss of articular motion and restricted motion of soft tissue. Date:  3-10-22 Date:     Activity/Exercise Parameters Parameters   Wand Flexion in Supine 10 reps  5 sec holds    Nautilus Press #30 x 20 reps    Nautilus Rows #45 x 20 reps    Wand External Rotation in Supine 10 reps  5 sec holds    Pendulum Exercises (Circles CW/CCW) 20 reps each    Shoulder Pulley into Flexion-Scaption 20 reps  5 sec holds    Punch out Black x 20 reps    Bench Press #10 x 20 reps    Wall Walk 10 reps    Bent over Flexion  Bent over Scaption  Bent over Extension #5  Bent over Rows Bent Over Shoulder Ext  3 Lbs  20 reps;   Rows  17.5 Lbs  20 reps    Standing flexion - Scaption #1 x 15 reps    UBE Manual L3  6 minutes    tband ER IR blue T-band  20 reps each    tband Rows  tband extensions Black T-band  20 reps each    Supine wand Push Ups x    Bicep curl #10 Lbs  20 reps        MedBridge Portal  Treatment/Session Summary:    · Response to Treatment:  Pt tolerated all treatments well today with min c/o R shoulder pain. Patient continues to not be able to reach behind her back at this time- told her to add the towel stretch for this for HEP. Patient would benefit from continued therapy for Right UE. · Communication/Consultation:  None today  · Equipment provided today:  None today  · Recommendations/Intent for next treatment session: Next visit will focus on progression of ROM/strengthening R shoulder as tolerable.     Total Treatment Billable Duration:  45 minutes  PT Patient Time In/Time Out  Time In: 0800  Time Out: [address], PTA   Visit # Therapist initials Date Arrived NS/ Cx < 24 hr >24 hr Cx Visit Comments   1 BW 02-16-22 X   Initial Assessment and Treatment   2 rjk 2-22-22 x      3 rjk 2-24-22 x      4 rjk 3-1-22 x      5 BW 03-03-22 X      6 rjk 3-8-22 x      7 rjk 3-10-22 x                                                                                                           Abbreviations: NS = No Show; CX = cancelled      Future Appointments   Date Time Provider Charity Webb   3/10/2022  8:00 AM Gwynneth Mode, PTA [facility]   3/14/2022  8:00 AM Gwynneth Mode, PTA [facility]   3/16/2022  8:00 AM Gwynneth Mode, PTA [facility]   4/21/2022  8:10 AM MAT LAB [facility]   5/3/2022  8:20 AM MD YANETH Caballero [facility]

## 2022-03-14 ENCOUNTER — HOSPITAL ENCOUNTER (OUTPATIENT)
Dept: PHYSICAL THERAPY | Age: 67
Discharge: HOME | End: 2022-03-14
Attending: PHYSICIAN ASSISTANT
Payer: MEDICARE

## 2022-03-14 PROCEDURE — 97140 MANUAL THERAPY 1/> REGIONS: CPT

## 2022-03-14 PROCEDURE — 97110 THERAPEUTIC EXERCISES: CPT

## 2022-03-14 NOTE — PROGRESS NOTES
Regina WESLEY Valeria Tidwell  : 1955  Primary: Brinda Rodríguez Medicare Advantage  Secondary:  [address]  at [place] [id number] [facility], [address] [place] [address],[address], [place] [address].  [phone]   [phone]         OUTPATIENT PHYSICAL THERAPY: Daily Treatment Note 3/14/2022  Visit Count:  8    ICD-10: Treatment Diagnosis: Pain in right shoulder (M25.511)  Precautions/Allergies:   Ciprofloxacin, Levaquin [levofloxacin], and Pcn [penicillins]   TREATMENT PLAN:  Effective Dates: 2022 TO 2022 (90 days). Frequency/Duration: 2 times a week for 90 Day(s)    Pre-treatment Symptoms/Complaints:  R shoulder pain, weakness and decreased ROM;  \"My rotator cuff is bothering me, worked out this morning\"  Pain: Initial:   1/10 Post Session:  2/10   Medications Last Reviewed:  3/14/2022  Updated Objective Findings:  None Today  TREATMENT:     THERAPEUTIC EXERCISE: (25 minutes):  Exercises per grid below to improve mobility and strength. Required minimal verbal cues to promote proper body alignment and promote proper body posture. Progressed resistance and repetitions as indicated. MANUAL THERAPY: (20 minutes): PROM R shoulder into flexion, abduction, internal rotation and external rotation was utilized and necessary because of the patient's restricted joint motion, loss of articular motion and restricted motion of soft tissue. Date:  3-14-22 Date:     Activity/Exercise Parameters Parameters   Wand Flexion in Supine 10 reps  5 sec holds    Nautilus Press #30 x 20 reps    Nautilus Rows #45 x 20 reps    Wand External Rotation in Supine 10 reps  5 sec holds    Pendulum Exercises (Circles CW/CCW) 20 reps each    Shoulder Pulley into Flexion-Scaption 20 reps  5 sec holds    Punch out Black x 20 reps    Bench Press #10 x 20 reps    Wall Walk 10 reps    Bent over Flexion  Bent over Scaption  Bent over Extension #5  Bent over Rows Bent Over Shoulder Ext  3 Lbs  20 reps;   Rows  17.5 Lbs  20 reps    Standing flexion - Scaption #1 x 15 reps    UBE Manual L3  6 minutes    tband ER IR blue T-band  20 reps each    tband Rows  tband extensions Black T-band  20 reps each    Supine wand Push Ups x    Bicep curl #10 Lbs  20 reps        MedBridge Portal  Treatment/Session Summary:    · Response to Treatment:  Pt tolerated all treatments well today with min c/o R shoulder pain. Patient sore this morning secondary to already working out. .  · Communication/Consultation:  None today  · Equipment provided today:  None today  · Recommendations/Intent for next treatment session: Next visit will focus on progression of ROM/strengthening R shoulder as tolerable.     Total Treatment Billable Duration:  45 minutes  PT Patient Time In/Time Out  Time In: 0800  Time Out: [address], PTA   Visit # Therapist initials Date Arrived NS/ Cx < 24 hr >24 hr Cx Visit Comments   1 BW 02-16-22 X   Initial Assessment and Treatment   2 rjk 2-22-22 x      3 rjk 2-24-22 x      4 rjk 3-1-22 x      5 BW 03-03-22 X      6 rjk 3-8-22 x      7 rjk 3-10-22 x      8 rjk 3-14-22 x                                                                                                  Abbreviations: NS = No Show; CX = cancelled      Future Appointments   Date Time Provider Charity Webb   3/14/2022  8:00 AM Sushila Sharp PTA [facility]   3/16/2022  8:00 AM Sushila Sharp PTA [facility]   4/21/2022  8:10 AM MAT LAB YANETH SALDAÑA [facility]   5/3/2022  8:20 AM MD YANETH Muse [facility]

## 2022-03-15 ENCOUNTER — APPOINTMENT (OUTPATIENT)
Dept: PHYSICAL THERAPY | Age: 67
End: 2022-03-15
Attending: PHYSICIAN ASSISTANT
Payer: MEDICARE

## 2022-03-16 ENCOUNTER — HOSPITAL ENCOUNTER (OUTPATIENT)
Dept: PHYSICAL THERAPY | Age: 67
Discharge: HOME | End: 2022-03-16
Attending: PHYSICIAN ASSISTANT
Payer: MEDICARE

## 2022-03-16 PROCEDURE — 97140 MANUAL THERAPY 1/> REGIONS: CPT

## 2022-03-16 PROCEDURE — 97110 THERAPEUTIC EXERCISES: CPT

## 2022-03-16 NOTE — PROGRESS NOTES
Regina WESLEY Anthony Salazar  : 1955  Primary: Chente Rivas Medicare Advantage  Secondary:  [address]  at [place] [address],[address], [address] [facility]  [phone]   [phone]         OUTPATIENT PHYSICAL THERAPY: Daily Treatment Note 3/16/2022  Visit Count:  9    ICD-10: Treatment Diagnosis: Pain in right shoulder (M25.511)  Precautions/Allergies:   Ciprofloxacin, Levaquin [levofloxacin], and Pcn [penicillins]   TREATMENT PLAN:  Effective Dates: 2022 TO 2022 (90 days). Frequency/Duration: 2 times a week for 90 Day(s)    Pre-treatment Symptoms/Complaints:  R shoulder pain, weakness and decreased ROM;  \"I am doing good\"  Pain: Initial:   1/10 Post Session:  2/10   Medications Last Reviewed:  3/16/2022  Updated Objective Findings:  None Today  TREATMENT:     THERAPEUTIC EXERCISE: (25 minutes):  Exercises per grid below to improve mobility and strength. Required minimal verbal cues to promote proper body alignment and promote proper body posture. Progressed resistance and repetitions as indicated. MANUAL THERAPY: (20 minutes): PROM R shoulder into flexion, abduction, internal rotation and external rotation was utilized and necessary because of the patient's restricted joint motion, loss of articular motion and restricted motion of soft tissue. Date:  3-16-22 Date:     Activity/Exercise Parameters Parameters   Wand Flexion in Supine 10 reps  5 sec holds    Nautilus Press #32 x 20 reps    Nautilus Rows #47 x 20 reps    Wand External Rotation in Supine 10 reps  5 sec holds    Pendulum Exercises (Circles CW/CCW) 20 reps each    Shoulder Pulley into Flexion-Scaption 20 reps  5 sec holds    Punch out Black x 20 reps    Bench Press #10 x 20 reps    Wall Walk 10 reps    Bent over Flexion  Bent over Scaption  Bent over Extension #5  Bent over Rows Bent Over Shoulder Ext  5 Lbs  20 reps;   Rows  17.5 Lbs  20 reps    Standing flexion - Scaption #2 x 15 reps    UBE Manual L3  6 minutes    tband ER IR blue T-band  20 reps each    tband Rows  tband extensions Black T-band  20 reps each    Supine wand Push Ups x    Bicep curl #10 Lbs  20 reps        MedBridge Portal  Treatment/Session Summary:    · Response to Treatment:  Pt tolerated all treatments well today with min c/o R shoulder pain. · Communication/Consultation:  None today  · Equipment provided today:  None today  · Recommendations/Intent for next treatment session: Next visit will focus on progression of ROM/strengthening R shoulder as tolerable.     Total Treatment Billable Duration:  45 minutes  PT Patient Time In/Time Out  Time In: 0800  Time Out: [address], PTA   Visit # Therapist initials Date Arrived NS/ Cx < 24 hr >24 hr Cx Visit Comments   1 BW 02-16-22 X   Initial Assessment and Treatment   2 rjk 2-22-22 x      3 rjk 2-24-22 x      4 rjk 3-1-22 x      5 BW 03-03-22 X      6 rjk 3-8-22 x      7 rjk 3-10-22 x      8 rjk 3-14-22 x      9 rjk 3-16-22 x                                                                                         Abbreviations: NS = No Show; CX = cancelled      Future Appointments   Date Time Provider Charity Webb   3/16/2022  8:00 AM [facility]   4/21/2022  8:10 AM MAT LAB [facility]   5/3/2022  8:20 AM MD YANETH Quarles [facility]

## 2022-03-17 ENCOUNTER — APPOINTMENT (OUTPATIENT)
Dept: PHYSICAL THERAPY | Age: 67
End: 2022-03-17
Attending: PHYSICIAN ASSISTANT
Payer: MEDICARE

## 2022-03-28 ENCOUNTER — HOSPITAL ENCOUNTER (OUTPATIENT)
Dept: PHYSICAL THERAPY | Age: 67
Discharge: HOME | End: 2022-03-28
Attending: PHYSICIAN ASSISTANT
Payer: MEDICARE

## 2022-03-28 PROCEDURE — 97140 MANUAL THERAPY 1/> REGIONS: CPT

## 2022-03-28 PROCEDURE — 97110 THERAPEUTIC EXERCISES: CPT

## 2022-03-28 NOTE — PROGRESS NOTES
Regina Wu Saha  : 1955  Primary: Aleks Moreland Medicare Advantage  Secondary:  [address]  at [place] [id number] [facility], [address] [place] [address],[address] [place] [address].  [phone]   [phone]         OUTPATIENT PHYSICAL THERAPY: Daily Treatment Note 3/28/2022  Visit Count:  10    ICD-10: Treatment Diagnosis: Pain in right shoulder (M25.511)  Precautions/Allergies:   Ciprofloxacin, Levaquin [levofloxacin], and Pcn [penicillins]   TREATMENT PLAN:  Effective Dates: 2022 TO 2022 (90 days). Frequency/Duration: 2 times a week for 90 Day(s)    Pre-treatment Symptoms/Complaints:  R shoulder pain, weakness and decreased ROM;  Pt states her shoulder is doing pretty good today. Pain: Initial:   1/10 Post Session:  2/10   Medications Last Reviewed:  3/28/2022  Updated Objective Findings:  None Today  TREATMENT:     THERAPEUTIC EXERCISE: (25 minutes):  Exercises per grid below to improve mobility and strength. Required minimal verbal cues to promote proper body alignment and promote proper body posture. Progressed resistance and repetitions as indicated. MANUAL THERAPY: (15 minutes): PROM R shoulder into flexion, abduction, internal rotation and external rotation was utilized and necessary because of the patient's restricted joint motion, loss of articular motion and restricted motion of soft tissue.         Date:  3-16-22 Date:  22   Activity/Exercise Parameters Parameters   Wand Flexion in Supine 10 reps  5 sec holds 10 reps  5 sec holds   Nautilus Press #32 x 20 reps 32 Lbs  20 reps   Nautilus Rows #47 x 20 reps 47 Lbs  20 reps   Wand External Rotation in Supine 10 reps  5 sec holds 10 reps  5 sec holds   Pendulum Exercises (Circles CW/CCW) 20 reps each 20 reps each   Shoulder Pulley into Flexion-Scaption 20 reps  5 sec holds 20 reps  5 sec holds   Punch out Black x 20 reps    Bench Press #10 x 20 reps    Wall Walk 10 reps 10 reps   Bent over Flexion  Bent over Scaption  Bent over Extension #5  Bent over Rows Bent Over Shoulder Ext  5 Lbs  20 reps; Rows  17.5 Lbs  20 reps Bent Over Shoulder Ext  5 Lbs  20 reps   Standing flexion - Scaption #2 x 15 reps    UBE Manual L3  6 minutes Manual L3  6 minutes   tband ER IR blue T-band  20 reps each Blue T-band  20 reps each   tband Rows  tband extensions Black T-band  20 reps each Black T-band  20 reps   Supine wand Push Ups x    Bicep curl #10 Lbs  20 reps 10 Lbs  20 reps       MedBridge Portal  Treatment/Session Summary:    · Response to Treatment:  Pt tolerated all treatments well today with min c/o R shoulder pain. Overall ROM/strength improving R shoulder. · Communication/Consultation:  None today  · Equipment provided today:  None today  · Recommendations/Intent for next treatment session: Next visit will focus on progression of ROM/strengthening R shoulder as tolerable.     Total Treatment Billable Duration:  40 minutes  PT Patient Time In/Time Out  Time In: 0805  Time Out: [facility], PT   Visit # Therapist initials Date Arrived NS/ Cx < 24 hr >24 hr Cx Visit Comments   1 BW 02-16-22 X   Initial Assessment and Treatment   2 rjk 2-22-22 x      3 rjk 2-24-22 x      4 rjk 3-1-22 x      5 BW 03-03-22 X      6 rjk 3-8-22 x      7 rjk 3-10-22 x      8 rjk 3-14-22 x      9 rjk 3-16-22 x      10 BW 03-28-22 X                                                                                Abbreviations: NS = No Show; CX = cancelled      Future Appointments   Date Time Provider Charity Webb   3/31/2022  8:00 AM Argelia Melchor PTA [facility]   4/21/2022  8:10 AM MAT LAB Y[facility]ETH [facility]DA[facility]   5/3/2022  8:20 AM MD YANETH Marin [facility]

## 2022-03-28 NOTE — PROGRESS NOTES
Regina Dove  : 1955  Primary: Kristina Yooaslhie Medicare Advantage  Secondary:  [address] at [place] [id number] [facility], [address] [place] [address],[address] [address].  [phone]   [phone]           OUTPATIENT PHYSICAL THERAPY:Progress Report 3/28/2022   ICD-10: Treatment Diagnosis: Pain in right shoulder (M25.511)  Precautions/Allergies:   Ciprofloxacin, Levaquin [levofloxacin], and Pcn [penicillins]   TREATMENT PLAN:  Effective Dates: 2022 TO 2022 (90 days). Frequency/Duration: 2 times a week for 90 Day(s) MEDICAL/REFERRING DIAGNOSIS:  Pain in right shoulder [M25.511]   DATE OF ONSET: Chronic R shoulder pain  REFERRING PHYSICIAN: DONATO Palmer MD Orders: Evaluate and Treat  Return MD Appointment: Pt does not have a follow-up appt at this time     INITIAL ASSESSMENT:  Ms. Kenneth Dove has attended 10 visits of physical therapy from 22 to 22 with increased ROM/strength R shoulder. Pt would benefit from continued skilled physical therapy services to help return to prior level of function. PROBLEM LIST (Impacting functional limitations):  1. Decreased Strength  2. Decreased ADL/Functional Activities  3. Increased Pain  4. Decreased Flexibility/Joint Mobility  5. Decreased [place] with Home Exercise Program INTERVENTIONS PLANNED: (Treatment may consist of any combination of the following)  1. Cold  2. Cryotherapy  3. Electrical Stimulation  4. Heat  5. Home Exercise Program (HEP)  6. Manual Therapy  7. Range of Motion (ROM)  8. Therapeutic Exercise/Strengthening  9. Ultrasound (US)     GOALS: (Goals have been discussed and agreed upon with patient.)  Short-Term Functional Goals: Time Frame: 4 weeks  1. Pt will increase ROM R shoulder flexion = 140 degrees, abduction = 140 degrees, internal rotation = 50 degrees, external rotation = 60 degrees to assist with daily activities (Goal Met)  2.  Pt will increase strength R shoulder 4-/5 to assist with daily activities (Goal Met)  3. Pt will be independent with HEP (Goal Met)  Discharge Goals: Time Frame: 12 weeks  1. Pt will increase ROM R shoulder flexion = 160 degrees, abduction = 160 degrees, internal rotation = 70 degrees, external rotation = 80 degrees to assist with daily activities  2. Pt will increase strength R shoulder 4/5 to assist with daily activities  3. Pt will sleep 6 hours without awakening due to R shoulder pain    OUTCOME MEASURE:   Tool Used: Disabilities of the Arm, Shoulder and Hand (DASH) Questionnaire - Quick Version  Score:  Initial: 18/55  Most Recent: 25/55 (Date: 03-28-22)   Interpretation of Score: The DASH is designed to measure the activities of daily living in person's with upper extremity dysfunction or pain. Each section is scored on a 1-5 scale, 5 representing the greatest disability. The scores of each section are added together for a total score of 55. MEDICAL NECESSITY:   · Patient is expected to demonstrate progress in strength, range of motion and functional technique to increase independence with daily activities. · Patient demonstrates good rehab potential due to higher previous functional level. REASON FOR SERVICES/OTHER COMMENTS:  · Patient continues to require skilled intervention due to decreased ROM/strength R shoulder with increased pain leading to decreased functional status. Total Duration:       Rehabilitation Potential For Stated Goals: Good  Regarding Regina Gross's therapy, I certify that the treatment plan above will be carried out by a therapist or under their direction. Thank you for this referral,  Lisette Casiano, PT     Referring Physician Signature: DONATO Montague _______________________________ Date _____________      PAIN/SUBJECTIVE:   Initial:   0-1/10 Post Session:  0-1/10   HISTORY:   History of Injury/Illness (Reason for Referral):  Pt reports insidious onset R shoulder pain since college.   She states she tore a tendon in her shoulder when swimming. She states she did not have any surgery. Pt states she had a R frozen shoulder years ago also. Pt had x-rays of her R shoulder which revealed arthritis. Pt states MD did not mention shoulder replacement surgery at this time. She reports difficulties reaching overhead and behind her back. She rates her current R shoulder pain 0-1/10 sitting at rest, increasing to 3-4/10 at worst over the past week. Aggravating factors include use of R UE and sleeping on her R side. Relieving factors include heat and IcyHot. She is R-handed. She denies any neck pain or UE numbness/tingling. Pt has not had any treatment thus far for her R shoulder. Pt works in finances/insurance (mostly desk job). Pt states she has had some acupuncture recently with some benefit in ROM. Pt meets with a  3 times a week. Past Medical History/Comorbidities:   Ms. Georgette Maya  has a past medical history of Aphagia, Arthritis, Breast cancer ([address] Cancer ([address] (3/10), Chemotherapy convalescence or palliative care, GERD (gastroesophageal reflux disease), Hypertension, Radiation therapy complication, S/P prosthetic total arthroplasty of the hip, Thromboembolus ([address] and Thyroid disease. She has no past medical history of Adverse effect of anesthesia, Aneurysm ([address] Coagulation defects, COPD, Difficult intubation, Heart failure ([address] Malignant hyperthermia due to anesthesia, Morbid obesity ([address] Nausea & vomiting, Other ill-defined conditions(799.89), Pseudocholinesterase deficiency, Psychiatric disorder, Unspecified adverse effect of anesthesia, or Unspecified sleep apnea. Ms. Georgette Maya  has a past surgical history that includes hx vascular access (2012); hx heent (3/2011); hx laparotomy (1993); vascular access placement (7/2011); hx dilation and curettage (2009); hx heart catheterization (5/28/13); hx breast lumpectomy (4/2011); hx cholecystectomy (6/18/13); hx hip replacement (12/2015); and hx hip replacement (12/13/2011). Social History/Living Environment:     Pt currently having difficulties with heavier household ADL's and fastening her bra due to decreased ROM R shoulder  Prior Level of Function/Work/Activity:  Pt works in finances/insurance  Dominant Side:         RIGHT  Other Clinical Tests:          X-rays R shoulder revealed arthritis  Personal Factors:          Sex:  female        Age:  73 y.o. Profession:  Pt works in finances/insurance   Ambulatory/Rehab [address] [facility] [address] Assessment   Risk Factors:       No Risk Factors Identified Ability to Rise from Chair:       (0)  Ability to rise in a single movement   Falls Prevention Plan:       No modifications necessary   Total: (5 or greater = High Risk): 0   ©2010 [facility] [address] [place] [address] States Patent #[id number]. Federal Law prohibits the replication, distribution or use without written permission from [facility]   Current Medications:       Current Outpatient Medications:     diclofenac (VOLTAREN) 1 % gel, APPLY TO AFFECTED AREA FOUR TIMES DAILY. , Disp: 100 g, Rfl: 0    liothyronine (CYTOMEL) 5 mcg tablet, Take 5 mcg by mouth daily. , Disp: , Rfl:     LORazepam (ATIVAN) 0.5 mg tablet, Take 1 Tablet by mouth every six (6) hours as needed for Anxiety. , Disp: 60 Tablet, Rfl: 5    levothyroxine (Synthroid) 75 mcg tablet, Take 1 Tablet by mouth Daily (before breakfast). , Disp: 90 Tablet, Rfl: 3    OTHER,NON-FORMULARY,, T3 5 mcg daily, Disp: 90 Each, Rfl: 3    OTHER, daily. Tumeric 900 mg cap, Disp: , Rfl:     omega-3 fatty acids-vitamin e 1,000 mg cap, Take 1 Cap by mouth daily. , Disp: , Rfl:     CALCIUM CARBONATE/VITAMIN D3 (CALCIUM 600 + D,3, PO), Take 1 Tab by mouth two (2) times a day., Disp: , Rfl:     diphenhydrAMINE (BENADRYL) 25 mg capsule, Take 50 mg by mouth nightly as needed. , Disp: , Rfl:    Date Last Reviewed:  02-16-22   Number of Personal Factors/Comorbidities that affect the Plan of Care: 1-2: MODERATE COMPLEXITY   EXAMINATION:   Observation/Orthostatic Postural Assessment: Forward head, rounded shoulders  Palpation:          Tenderness R anterior/lateral deltoid, bicep tendon, subscapularis and lats  ROM:    R shoulder flexion = 150 degrees  R shoulder abduction = 150 degrees  R shoulder internal rotation = 50 degrees  R shoulder external rotation = 60 degrees    L shoulder flexion = 170 degrees  L shoulder abduction = 170 degrees  L shoulder internal rotation = 80 degrees  L shoulder external rotation = 90 degrees    Strength:    R shoulder flexion = 4-/5  R shoulder abduction = 4-/5  R shoulder internal rotation = 4-/5  R shoulder external rotation = 4-/5    L shoulder flexion = 5/5  L shoulder abduction = 5/5  L shoulder internal rotation = 5/5  L shoulder external rotation = 5/5    Special Tests:          Hawkin's Segundo aggravates R shoulder symptoms  Neurological Screen:        Sensation: Intact to light touch R UE  Functional Mobility:         Transfers:  Pt able to transition sit to supine and supine to sit independently    Body Structures Involved:  1. Bones  2. Joints  3. Muscles Body Functions Affected:  1. Sensory/Pain  2. Neuromusculoskeletal Activities and Participation Affected:  1. Mobility  2.  Domestic Life   Number of elements (examined above) that affect the Plan of Care: 3: MODERATE COMPLEXITY   CLINICAL PRESENTATION:   Presentation: Evolving clinical presentation with changing clinical characteristics: MODERATE COMPLEXITY   CLINICAL DECISION MAKING:   Use of outcome tool(s) and clinical judgement create a POC that gives a: Questionable prediction of patient's progress: MODERATE COMPLEXITY

## 2022-03-31 ENCOUNTER — HOSPITAL ENCOUNTER (OUTPATIENT)
Dept: PHYSICAL THERAPY | Age: 67
End: 2022-03-31
Attending: PHYSICIAN ASSISTANT
Payer: MEDICARE

## 2022-04-20 NOTE — THERAPY DISCHARGE
Regina LUPILLO Davila  : 1955  Primary: Vertell Dubin Medicare Advantage  Secondary:  [address]  at [address] [place] [address],[address] [place] [address].  [phone]   [phone]           OUTPATIENT PHYSICAL THERAPY:Discharge Summary 2022   ICD-10: Treatment Diagnosis: Pain in right shoulder (M25.511)  Precautions/Allergies:   Ciprofloxacin, Levaquin [levofloxacin], and Pcn [penicillins]   TREATMENT PLAN:  Effective Dates: 2022 TO 2022 (90 days). Frequency/Duration: 2 times a week for 90 Day(s) MEDICAL/REFERRING DIAGNOSIS:  Right shoulder pain, unspecified chronicity [M25.511]  Primary osteoarthritis of right shoulder [M19.011]   DATE OF ONSET: Chronic R shoulder pain  REFERRING PHYSICIAN: DONATO Durbin MD Orders: Evaluate and Treat  Return MD Appointment: Pt does not have a follow-up appt at this time     INITIAL ASSESSMENT:  Ms. Andrez Davila was last seen for PT on 22. She did not return for any further PT after that date. Discharge from PT. PROBLEM LIST (Impacting functional limitations):  1. Decreased Strength  2. Decreased ADL/Functional Activities  3. Increased Pain  4. Decreased Flexibility/Joint Mobility  5. Decreased [place] with Home Exercise Program INTERVENTIONS PLANNED: (Treatment may consist of any combination of the following)  1. Cold  2. Cryotherapy  3. Electrical Stimulation  4. Heat  5. Home Exercise Program (HEP)  6. Manual Therapy  7. Range of Motion (ROM)  8. Therapeutic Exercise/Strengthening  9. Ultrasound (US)     GOALS: (Goals have been discussed and agreed upon with patient.)  Short-Term Functional Goals: Time Frame: 4 weeks  1. Pt will increase ROM R shoulder flexion = 140 degrees, abduction = 140 degrees, internal rotation = 50 degrees, external rotation = 60 degrees to assist with daily activities (Goal Met)  2. Pt will increase strength R shoulder 4-/5 to assist with daily activities (Goal Met)  3.  Pt will be independent with HEP (Goal Met)  Discharge Goals: Time Frame: 12 weeks  1. Pt will increase ROM R shoulder flexion = 160 degrees, abduction = 160 degrees, internal rotation = 70 degrees, external rotation = 80 degrees to assist with daily activities  2. Pt will increase strength R shoulder 4/5 to assist with daily activities  3. Pt will sleep 6 hours without awakening due to R shoulder pain    OUTCOME MEASURE:   Tool Used: Disabilities of the Arm, Shoulder and Hand (DASH) Questionnaire - Quick Version  Score:  Initial: 18/55  Most Recent: 25/55 (Date: 03-28-22)   Interpretation of Score: The DASH is designed to measure the activities of daily living in person's with upper extremity dysfunction or pain. Each section is scored on a 1-5 scale, 5 representing the greatest disability. The scores of each section are added together for a total score of 55. MEDICAL NECESSITY:   · Patient is expected to demonstrate progress in strength, range of motion and functional technique to increase independence with daily activities. · Patient demonstrates good rehab potential due to higher previous functional level. REASON FOR SERVICES/OTHER COMMENTS:  · Patient continues to require skilled intervention due to decreased ROM/strength R shoulder with increased pain leading to decreased functional status. Total Duration:  PT Patient Time In/Time Out  Time In: 1345  Time Out: 1430    Rehabilitation Potential For Stated Goals: Good  Regarding Regina Gross's therapy, I certify that the treatment plan above will be carried out by a therapist or under their direction. Thank you for this referral,  Joe Osborne, PT     Referring Physician Signature: DONATO Rosales _______________________________ Date _____________      PAIN/SUBJECTIVE:   Initial:   0-1/10 Post Session:  0-1/10   HISTORY:   History of Injury/Illness (Reason for Referral):  Pt reports insidious onset R shoulder pain since college.   She states she tore a tendon in her shoulder when swimming. She states she did not have any surgery. Pt states she had a R frozen shoulder years ago also. Pt had x-rays of her R shoulder which revealed arthritis. Pt states MD did not mention shoulder replacement surgery at this time. She reports difficulties reaching overhead and behind her back. She rates her current R shoulder pain 0-1/10 sitting at rest, increasing to 3-4/10 at worst over the past week. Aggravating factors include use of R UE and sleeping on her R side. Relieving factors include heat and IcyHot. She is R-handed. She denies any neck pain or UE numbness/tingling. Pt has not had any treatment thus far for her R shoulder. Pt works in finances/insurance (mostly desk job). Pt states she has had some acupuncture recently with some benefit in ROM. Pt meets with a  3 times a week. Past Medical History/Comorbidities:   Ms. Jill Johnston  has a past medical history of Aphagia, Arthritis, Breast cancer ([address] Cancer ([address] (3/10), Chemotherapy convalescence or palliative care, GERD (gastroesophageal reflux disease), Hypertension, Radiation therapy complication, S/P prosthetic total arthroplasty of the hip, Thromboembolus ([address] and Thyroid disease. She has no past medical history of Adverse effect of anesthesia, Aneurysm ([address] Coagulation defects, COPD, Difficult intubation, Heart failure ([address] Malignant hyperthermia due to anesthesia, Morbid obesity ([address] Nausea & vomiting, Other ill-defined conditions(799.89), Pseudocholinesterase deficiency, Psychiatric disorder, Unspecified adverse effect of anesthesia, or Unspecified sleep apnea. Ms. Jill Johnston  has a past surgical history that includes hx vascular access (2012); hx heent (3/2011); hx laparotomy (1993); vascular access placement (7/2011); hx dilation and curettage (2009); hx heart catheterization (5/28/13); hx breast lumpectomy (4/2011); hx cholecystectomy (6/18/13); hx hip replacement (12/2015); and hx hip replacement (12/13/2011). Social History/Living Environment:     Pt currently having difficulties with heavier household ADL's and fastening her bra due to decreased ROM R shoulder  Prior Level of Function/Work/Activity:  Pt works in finances/insurance  Dominant Side:         RIGHT  Other Clinical Tests:          X-rays R shoulder revealed arthritis  Personal Factors:          Sex:  female        Age:  73 y.o. Profession:  Pt works in finances/insurance   Ambulatory/Rehab [address] Assessment   Risk Factors:       No Risk Factors Identified Ability to Rise from Chair:       (0)  Ability to rise in a single movement   Falls Prevention Plan:       No modifications necessary   Total: (5 or greater = High Risk): 0   ©2010 [facility] [address] [place] [address] States Patent #[id number]. Federal Law prohibits the replication, distribution or use without written permission from [facility] [employer]   Current Medications:       Current Outpatient Medications:     diclofenac (VOLTAREN) 1 % gel, APPLY TO AFFECTED AREA FOUR TIMES DAILY. , Disp: 100 g, Rfl: 0    liothyronine (CYTOMEL) 5 mcg tablet, Take 5 mcg by mouth daily. , Disp: , Rfl:     LORazepam (ATIVAN) 0.5 mg tablet, Take 1 Tablet by mouth every six (6) hours as needed for Anxiety. , Disp: 60 Tablet, Rfl: 5    levothyroxine (Synthroid) 75 mcg tablet, Take 1 Tablet by mouth Daily (before breakfast). , Disp: 90 Tablet, Rfl: 3    OTHER,NON-FORMULARY,, T3 5 mcg daily, Disp: 90 Each, Rfl: 3    OTHER, daily. Tumeric 900 mg cap, Disp: , Rfl:     omega-3 fatty acids-vitamin e 1,000 mg cap, Take 1 Cap by mouth daily. , Disp: , Rfl:     CALCIUM CARBONATE/VITAMIN D3 (CALCIUM 600 + D,3, PO), Take 1 Tab by mouth two (2) times a day., Disp: , Rfl:     diphenhydrAMINE (BENADRYL) 25 mg capsule, Take 50 mg by mouth nightly as needed. , Disp: , Rfl:    Date Last Reviewed:  02-16-22   Number of Personal Factors/Comorbidities that affect the Plan of Care: 1-2: MODERATE COMPLEXITY   EXAMINATION:   Observation/Orthostatic Postural Assessment: Forward head, rounded shoulders  Palpation:          Tenderness R anterior/lateral deltoid, bicep tendon, subscapularis and lats  ROM:    R shoulder flexion = 150 degrees  R shoulder abduction = 150 degrees  R shoulder internal rotation = 50 degrees  R shoulder external rotation = 60 degrees    L shoulder flexion = 170 degrees  L shoulder abduction = 170 degrees  L shoulder internal rotation = 80 degrees  L shoulder external rotation = 90 degrees    Strength:    R shoulder flexion = 4-/5  R shoulder abduction = 4-/5  R shoulder internal rotation = 4-/5  R shoulder external rotation = 4-/5    L shoulder flexion = 5/5  L shoulder abduction = 5/5  L shoulder internal rotation = 5/5  L shoulder external rotation = 5/5    Special Tests:          Hawkin's Segundo aggravates R shoulder symptoms  Neurological Screen:        Sensation: Intact to light touch R UE  Functional Mobility:         Transfers:  Pt able to transition sit to supine and supine to sit independently    Body Structures Involved:  1. Bones  2. Joints  3. Muscles Body Functions Affected:  1. Sensory/Pain  2. Neuromusculoskeletal Activities and Participation Affected:  1. Mobility  2.  Domestic Life   Number of elements (examined above) that affect the Plan of Care: 3: MODERATE COMPLEXITY   CLINICAL PRESENTATION:   Presentation: Evolving clinical presentation with changing clinical characteristics: MODERATE COMPLEXITY   CLINICAL DECISION MAKING:   Use of outcome tool(s) and clinical judgement create a POC that gives a: Questionable prediction of patient's progress: MODERATE COMPLEXITY

## 2022-10-10 ENCOUNTER — HOSPITAL ENCOUNTER (OUTPATIENT)
Dept: MAMMOGRAPHY | Age: 67
Discharge: HOME | End: 2022-10-13
Payer: MEDICARE

## 2022-10-10 DIAGNOSIS — Z12.31 VISIT FOR SCREENING MAMMOGRAM: ICD-10-CM

## 2022-10-10 PROCEDURE — 77067 SCR MAMMO BI INCL CAD: CPT

## 2022-10-18 DIAGNOSIS — E07.9 DISORDER OF THYROID, UNSPECIFIED: ICD-10-CM

## 2022-10-18 RX ORDER — LEVOTHYROXINE SODIUM 0.07 MG/1
TABLET ORAL
Qty: 90 TABLET | Refills: 3 | OUTPATIENT
Start: 2022-10-18

## 2022-10-27 DIAGNOSIS — F41.1 ANXIETY STATE: ICD-10-CM

## 2022-10-27 DIAGNOSIS — R73.01 ELEVATED FASTING GLUCOSE: ICD-10-CM

## 2022-10-27 DIAGNOSIS — E07.9 THYROID DISEASE: Primary | ICD-10-CM

## 2022-10-27 DIAGNOSIS — I10 ESSENTIAL HYPERTENSION, BENIGN: ICD-10-CM

## 2022-10-27 DIAGNOSIS — Z85.3 HISTORY OF CANCER OF LEFT BREAST: ICD-10-CM

## 2022-11-01 DIAGNOSIS — F41.1 ANXIETY STATE: ICD-10-CM

## 2022-11-01 DIAGNOSIS — E07.9 THYROID DISEASE: ICD-10-CM

## 2022-11-01 DIAGNOSIS — I10 ESSENTIAL HYPERTENSION, BENIGN: ICD-10-CM

## 2022-11-01 DIAGNOSIS — R73.01 ELEVATED FASTING GLUCOSE: ICD-10-CM

## 2022-11-01 DIAGNOSIS — Z85.3 HISTORY OF CANCER OF LEFT BREAST: ICD-10-CM

## 2022-11-01 LAB
ALBUMIN SERPL-MCNC: 3.7 G/DL (ref 3.2–4.6)
ALBUMIN/GLOB SERPL: 1.2 {RATIO} (ref 0.4–1.6)
ALP SERPL-CCNC: 84 U/L (ref 50–136)
ALT SERPL-CCNC: 29 U/L (ref 12–65)
ANION GAP SERPL CALC-SCNC: 6 MMOL/L (ref 2–11)
AST SERPL-CCNC: 15 U/L (ref 15–37)
BASOPHILS # BLD: 0.1 K/UL (ref 0–0.2)
BASOPHILS NFR BLD: 1 % (ref 0–2)
BILIRUB SERPL-MCNC: 0.6 MG/DL (ref 0.2–1.1)
BUN SERPL-MCNC: 16 MG/DL (ref 8–23)
CALCIUM SERPL-MCNC: 9.3 MG/DL (ref 8.3–10.4)
CHLORIDE SERPL-SCNC: 105 MMOL/L (ref 101–110)
CHOLEST SERPL-MCNC: 201 MG/DL
CO2 SERPL-SCNC: 27 MMOL/L (ref 21–32)
CREAT SERPL-MCNC: 0.8 MG/DL (ref 0.6–1)
DIFFERENTIAL METHOD BLD: NORMAL
EOSINOPHIL # BLD: 0.1 K/UL (ref 0–0.8)
EOSINOPHIL NFR BLD: 2 % (ref 0.5–7.8)
ERYTHROCYTE [DISTWIDTH] IN BLOOD BY AUTOMATED COUNT: 13.8 % (ref 11.9–14.6)
EST. AVERAGE GLUCOSE BLD GHB EST-MCNC: 128 MG/DL
GLOBULIN SER CALC-MCNC: 3.1 G/DL (ref 2.8–4.5)
GLUCOSE SERPL-MCNC: 113 MG/DL (ref 65–100)
HBA1C MFR BLD: 6.1 % (ref 4.8–5.6)
HCT VFR BLD AUTO: 46.2 % (ref 35.8–46.3)
HDLC SERPL-MCNC: 56 MG/DL (ref 40–60)
HDLC SERPL: 3.6 {RATIO}
HGB BLD-MCNC: 14.6 G/DL (ref 11.7–15.4)
IMM GRANULOCYTES # BLD AUTO: 0 K/UL (ref 0–0.5)
IMM GRANULOCYTES NFR BLD AUTO: 0 % (ref 0–5)
LDLC SERPL CALC-MCNC: 126.6 MG/DL
LYMPHOCYTES # BLD: 2.3 K/UL (ref 0.5–4.6)
LYMPHOCYTES NFR BLD: 35 % (ref 13–44)
MCH RBC QN AUTO: 29.3 PG (ref 26.1–32.9)
MCHC RBC AUTO-ENTMCNC: 31.6 G/DL (ref 31.4–35)
MCV RBC AUTO: 92.8 FL (ref 82–102)
MONOCYTES # BLD: 0.4 K/UL (ref 0.1–1.3)
MONOCYTES NFR BLD: 7 % (ref 4–12)
NEUTS SEG # BLD: 3.6 K/UL (ref 1.7–8.2)
NEUTS SEG NFR BLD: 55 % (ref 43–78)
NRBC # BLD: 0 K/UL (ref 0–0.2)
PLATELET # BLD AUTO: 293 K/UL (ref 150–450)
PMV BLD AUTO: 10.9 FL (ref 9.4–12.3)
POTASSIUM SERPL-SCNC: 5 MMOL/L (ref 3.5–5.1)
PROT SERPL-MCNC: 6.8 G/DL (ref 6.3–8.2)
RBC # BLD AUTO: 4.98 M/UL (ref 4.05–5.2)
SODIUM SERPL-SCNC: 138 MMOL/L (ref 133–143)
TRIGL SERPL-MCNC: 92 MG/DL (ref 35–150)
TSH, 3RD GENERATION: 1.22 UIU/ML (ref 0.36–3.74)
VLDLC SERPL CALC-MCNC: 18.4 MG/DL (ref 6–23)
WBC # BLD AUTO: 6.5 K/UL (ref 4.3–11.1)

## 2022-11-08 ENCOUNTER — OFFICE VISIT (OUTPATIENT)
Dept: INTERNAL MEDICINE CLINIC | Facility: CLINIC | Age: 67
End: 2022-11-08
Payer: MEDICARE

## 2022-11-08 VITALS
HEIGHT: 66 IN | DIASTOLIC BLOOD PRESSURE: 82 MMHG | SYSTOLIC BLOOD PRESSURE: 134 MMHG | HEART RATE: 82 BPM | BODY MASS INDEX: 29.09 KG/M2 | OXYGEN SATURATION: 100 % | TEMPERATURE: 97.1 F | WEIGHT: 181 LBS

## 2022-11-08 DIAGNOSIS — F41.1 ANXIETY STATE: ICD-10-CM

## 2022-11-08 DIAGNOSIS — I10 ESSENTIAL HYPERTENSION, BENIGN: ICD-10-CM

## 2022-11-08 DIAGNOSIS — E07.9 THYROID DISEASE: Primary | ICD-10-CM

## 2022-11-08 DIAGNOSIS — Z85.3 HISTORY OF CANCER OF LEFT BREAST: ICD-10-CM

## 2022-11-08 DIAGNOSIS — R73.01 ELEVATED FASTING GLUCOSE: ICD-10-CM

## 2022-11-08 PROCEDURE — 3074F SYST BP LT 130 MM HG: CPT | Performed by: INTERNAL MEDICINE

## 2022-11-08 PROCEDURE — 99214 OFFICE O/P EST MOD 30 MIN: CPT | Performed by: INTERNAL MEDICINE

## 2022-11-08 PROCEDURE — 3078F DIAST BP <80 MM HG: CPT | Performed by: INTERNAL MEDICINE

## 2022-11-08 PROCEDURE — 1123F ACP DISCUSS/DSCN MKR DOCD: CPT | Performed by: INTERNAL MEDICINE

## 2022-11-08 RX ORDER — LEVOTHYROXINE SODIUM 0.07 MG/1
75 TABLET ORAL
Qty: 90 TABLET | Refills: 3 | Status: SHIPPED | OUTPATIENT
Start: 2022-11-08

## 2022-11-08 RX ORDER — LORAZEPAM 0.5 MG/1
0.5 TABLET ORAL EVERY 6 HOURS PRN
Qty: 30 TABLET | Refills: 0 | Status: SHIPPED | OUTPATIENT
Start: 2022-11-08 | End: 2022-12-08

## 2022-11-08 RX ORDER — LIOTHYRONINE SODIUM 5 UG/1
5 TABLET ORAL DAILY
Qty: 90 TABLET | Refills: 3 | Status: SHIPPED | OUTPATIENT
Start: 2022-11-08

## 2022-11-08 NOTE — PROGRESS NOTES
ASSESSMENT/PLAN:    Controlled Substance Monitoring:    Acute and Chronic Pain Monitoring:   RX Monitoring 11/8/2022   Periodic Controlled Substance Monitoring No signs of potential drug abuse or diversion identified. 1. Thyroid disease  Treatment regimen is effective. - levothyroxine (SYNTHROID) 75 MCG tablet; Take 1 tablet by mouth every morning (before breakfast)  Dispense: 90 tablet; Refill: 3  - liothyronine (CYTOMEL) 5 MCG tablet; Take 1 tablet by mouth daily  Dispense: 90 tablet; Refill: 3  - TSH; Future    2. History of cancer of left breast  Treatment regimen is effective. Mammogram current. - Comprehensive Metabolic Panel; Future  - CBC; Future    3. Anxiety state  Rare usage. Treatment regimen is effective. - LORazepam (ATIVAN) 0.5 MG tablet; Take 1 tablet by mouth every 6 hours as needed for Anxiety for up to 30 days. Dispense: 30 tablet; Refill: 0  - TSH; Future    4. Essential hypertension, benign  Treatment regimen is effective. - Comprehensive Metabolic Panel; Future  - CBC; Future  - Lipid Panel; Future    5. Elevated fasting glucose  The patient is asked to make an attempt to improve diet and exercise patterns to aid in medical management of this problem. No meds recommended IMO. Monitor.      - Hemoglobin A1C; Future          Evaluation and management of the chronic condition(s) delineated. No negative side effects reported. I have reviewed all the lab results. There are some abnormalities that are either expected or not critical to the patient's health, and are discussed in the office today and are addressed. Please refer to the above assessement and plan narrative and orders and follow up plan. Medication discussed and refilled as needed. Physical exam findings are stable unless otherwise indicated and this is addressed.   The most recent lab work and imaging and consultant/urgent care visits and imaging are reviewed and discussed and considered during this visit encounter. 1. Thyroid disease  -     levothyroxine (SYNTHROID) 75 MCG tablet; Take 1 tablet by mouth every morning (before breakfast), Disp-90 tablet, R-3Normal  -     liothyronine (CYTOMEL) 5 MCG tablet; Take 1 tablet by mouth daily, Disp-90 tablet, R-3Normal  -     TSH; Future  2. History of cancer of left breast  -     Comprehensive Metabolic Panel; Future  -     CBC; Future  3. Anxiety state  -     LORazepam (ATIVAN) 0.5 MG tablet; Take 1 tablet by mouth every 6 hours as needed for Anxiety for up to 30 days. , Disp-30 tablet, R-0Normal  -     TSH; Future  4. Essential hypertension, benign  -     Comprehensive Metabolic Panel; Future  -     CBC; Future  -     Lipid Panel; Future  5. Elevated fasting glucose  -     Hemoglobin A1C; Future       On this date, 22, I have spent 30 minutes reviewing previous notes, test results and face to face with the patient discussing the diagnosis and importance of compliance with the treatment plan as well as documenting on the day of the visit. An electronic signature was used to authenticate this note. -- Nadya Martinez MD     Return in about 6 months (around 2023). SUBJECTIVE/OBJECTIVE:      HPI:   Danya Bosch. Gilford Kleine (: 1955 is a 78 y.o. female, here for evaluation of the following chief complaint(s):   Chief Complaint   Patient presents with    Hypertension     6 month recheck    Thyroid Problem    Discuss Labs       Patient is here for follow-up and management of chronic medical conditions, review of recent labs, review of any imaging completed since our last office visit and discuss any consultants opinions or management changes. Labs reviewed and discussed and medication refilled as needed for chronic medications during ov or adjusted based on lab results and/or our discussion as appropriate. See discussion.     Mammogram Result (most recent):  [facility] DIGITAL SCREEN W OR WO CAD BILATERAL 10/10/2022         Impression  STABLE POST-TREATMENT APPEARANCE OF THE LEFT BREAST WITH NO SPECIFIC  MAMMOGRAPHIC EVIDENCE FOR MALIGNANCY ON EITHER SIDE. FOLLOW-UP BILATERAL  SCREENING MAMMOGRAPHY IS RECOMMENDED IN ONE YEAR. BI-RADS Assessment Category 2: Benign finding. A reminder letter will be scheduled. BD2    The patient's available records and electronic chart records are reviewed. The PMH, PSH, medications, allergies, medications, FH, health maintenance and vaccination status are all reviewed and updated as appropriate. Records from outside providers have been reviewed, summarized, and considered as noted in the history of present illness, past medical history, and objective data of this note and encounter.           Health Maintenance   Topic Date Due    Hepatitis C screen  Never done    COVID-19 Vaccine (4 - Booster for Rodriguez Peter series) 02/22/2022    Annual Wellness Visit (AWV)  10/29/2022    Flu vaccine (1) 12/08/2022 (Originally 8/1/2022)    Depression Screen  05/03/2023    Breast cancer screen  10/10/2023    A1C test (Diabetic or Prediabetic)  11/01/2023    Colorectal Cancer Screen  04/10/2025    Lipids  11/01/2027    DTaP/Tdap/Td vaccine (2 - Td or Tdap) 09/19/2028    DEXA (modify frequency per FRAX score)  Completed    Shingles vaccine  Completed    Pneumococcal 65+ years Vaccine  Completed    Hepatitis A vaccine  Aged Out    Hib vaccine  Aged Out    Meningococcal (ACWY) vaccine  Aged Out     Patient Active Problem List   Diagnosis    Osteopenia due to cancer therapy    Anxiety state    History of cancer of left breast    Essential hypertension, benign    Thyroid disease       Review of Systems    Lab Results   Component Value Date/Time    WBC 6.5 11/01/2022 08:41 AM    HGB 14.6 11/01/2022 08:41 AM    HCT 46.2 11/01/2022 08:41 AM    MCV 92.8 11/01/2022 08:41 AM    RDW 13.8 11/01/2022 08:41 AM     11/01/2022 08:41 AM    NEUTOPHILPCT 52 04/21/2022 11:22 AM    LYMPHOPCT 35 11/01/2022 08:41 AM    LYMPHOPCT 37 04/21/2022 11:22 AM    MONOPCT 7 11/01/2022 08:41 AM    MONOPCT 8 04/21/2022 11:22 AM    EOSRELPCT 2 11/01/2022 08:41 AM    BASOPCT 1 11/01/2022 08:41 AM    BASOPCT 1 04/21/2022 11:22 AM    LYMPHSABS 2.3 11/01/2022 08:41 AM    LYMPHSABS 2.4 04/21/2022 11:22 AM    MONOSABS 0.4 11/01/2022 08:41 AM    MONOSABS 0.5 04/21/2022 11:22 AM    EOSABS 0.1 11/01/2022 08:41 AM    EOSABS 0.2 04/21/2022 11:22 AM    BASOSABS 0.1 11/01/2022 08:41 AM    IMMGRAN 0 11/01/2022 08:41 AM    GRANULOCYTEABSOLUTECOUNT 0.0 04/21/2022 11:22 AM       Lab Results   Component Value Date/Time     11/01/2022 08:41 AM    K 5.0 11/01/2022 08:41 AM     11/01/2022 08:41 AM    CO2 27 11/01/2022 08:41 AM    ANIONGAP 6 11/01/2022 08:41 AM    GLUCOSE 113 11/01/2022 08:41 AM    BUN 16 11/01/2022 08:41 AM    CREATININE 0.80 11/01/2022 08:41 AM    GFRAA 84 10/19/2021 07:50 AM    LABGLOM >60 11/01/2022 08:41 AM    CALCIUM 9.3 11/01/2022 08:41 AM    BILITOT 0.6 11/01/2022 08:41 AM    ALT 29 11/01/2022 08:41 AM    AST 15 11/01/2022 08:41 AM    ALKPHOS 84 11/01/2022 08:41 AM    ALKPHOS 94 04/21/2022 11:22 AM    PROT 6.8 11/01/2022 08:41 AM    LABALBU 3.7 11/01/2022 08:41 AM    GLOB 3.1 11/01/2022 08:41 AM    ALBUMIN 1.2 11/01/2022 08:41 AM       Lab Results   Component Value Date/Time    CHOL 201 11/01/2022 08:41 AM    HDL 56 11/01/2022 08:41 AM    TRIG 92 11/01/2022 08:41 AM    LDLCALC 126.6 11/01/2022 08:41 AM    VLDL 17 04/21/2022 11:22 AM       Lab Results   Component Value Date/Time    LABA1C 6.1 11/01/2022 08:41 AM    LABA1C 6.2 04/21/2022 11:22 AM       Lab Results   Component Value Date/Time    TSH 1.780 04/21/2022 11:22 AM       No results found for: PSA    No results found for: Yanna Lesly, COLORU, CLARITYU, LEUKOCYTESUR, PROTEINU, GLUCOSEU, KETUA, BLOODU, BILIRUBINUR, UROBILINOGEN, NITRU, LABMICR        Vitals:    11/08/22 0843   BP: 134/82   Site: Left Upper Arm   Position: Sitting   Cuff Size: Small Adult   Pulse: 82   Temp: 97.1 °F (36.2 °C) TempSrc: Skin   SpO2: 100%   Weight: 181 lb (82.1 kg)   Height: 5' 6\" (1.676 m)     Wt Readings from Last 3 Encounters:   11/08/22 181 lb (82.1 kg)   05/03/22 193 lb (87.5 kg)   10/28/21 195 lb (88.5 kg)     BP Readings from Last 3 Encounters:   11/08/22 134/82   05/03/22 128/66   10/28/21 130/66     Physical Exam

## 2023-01-24 ENCOUNTER — TELEPHONE (OUTPATIENT)
Dept: INTERNAL MEDICINE CLINIC | Facility: CLINIC | Age: 68
End: 2023-01-24

## 2023-01-24 DIAGNOSIS — I10 ESSENTIAL HYPERTENSION, BENIGN: Primary | ICD-10-CM

## 2023-01-24 NOTE — TELEPHONE ENCOUNTER
Pt called to report readings of HBP after eye surgery.   After surgery:  6:15 /100  6:50 -96  9:00 /86  This morning 134/84

## 2023-01-30 NOTE — TELEPHONE ENCOUNTER
Call and check on her blood pressure.   If still elevated, schedule follow up to check with myself or NP or PA

## 2023-01-31 NOTE — TELEPHONE ENCOUNTER
Called and spoke with patient and she states that she started taking Quinapril HCTZ 12.5mg-20mg that she had left over from a previous prescription and her BP has been much lower and she feels much better. Does patient need to come in for evaluation to restart this medication?

## 2023-02-08 RX ORDER — QUINAPRIL HCL AND HYDROCHLOROTHIAZIDE 20; 12.5 MG/1; MG/1
1 TABLET ORAL DAILY
Qty: 90 TABLET | Refills: 3 | Status: SHIPPED | OUTPATIENT
Start: 2023-02-08

## 2023-02-08 NOTE — TELEPHONE ENCOUNTER
Orders Placed This Encounter    quinapril-hydroCHLOROthiazide (ACCURETIC) 20-12.5 MG per tablet     Sig: Take 1 tablet by mouth daily     Dispense:  90 tablet     Refill:  3     Ok to resume. Monitor BP and let me know if any concerns. Dov Perez MD

## 2023-02-16 ENCOUNTER — TELEPHONE (OUTPATIENT)
Dept: INTERNAL MEDICINE CLINIC | Facility: CLINIC | Age: 68
End: 2023-02-16

## 2023-02-16 DIAGNOSIS — I10 ESSENTIAL HYPERTENSION, BENIGN: Primary | ICD-10-CM

## 2023-02-16 RX ORDER — BENAZEPRIL HYDROCHLORIDE AND HYDROCHLOROTHIAZIDE 20; 12.5 MG/1; MG/1
1 TABLET ORAL DAILY
Qty: 30 TABLET | Refills: 5 | Status: SHIPPED | OUTPATIENT
Start: 2023-02-16

## 2023-02-16 NOTE — TELEPHONE ENCOUNTER
[facility] states that Alis Alexander is on back order and don't know when they will get a supply. Can an alternative be sent to replace. Alternatives are Lisinopril HCTZ 20/12.5 ,Fosinopril/HCTZ 20/12.5 or Benazepril HCTZ 20/12.5

## 2023-02-16 NOTE — TELEPHONE ENCOUNTER
Orders Placed This Encounter    benazepril-hydrochlorthiazide (LOTENSIN HCT) 20-12.5 MG per tablet     Sig: Take 1 tablet by mouth daily     Dispense:  30 tablet     Refill:  5     Medications Discontinued During This Encounter   Medication Reason    quinapril-hydroCHLOROthiazide (ACCURETIC) 20-12.5 MG per tablet Karis Lake MD

## 2023-02-19 ENCOUNTER — TELEPHONE (OUTPATIENT)
Dept: INTERNAL MEDICINE CLINIC | Facility: CLINIC | Age: 68
End: 2023-02-19
Payer: MEDICARE

## 2023-02-19 DIAGNOSIS — Z85.3 HISTORY OF CANCER OF LEFT BREAST: ICD-10-CM

## 2023-02-19 DIAGNOSIS — U07.1 COVID-19 VIRUS INFECTION: Primary | ICD-10-CM

## 2023-02-19 DIAGNOSIS — J06.9 UPPER RESPIRATORY INFECTION WITH COUGH AND CONGESTION: ICD-10-CM

## 2023-02-19 DIAGNOSIS — I10 ESSENTIAL HYPERTENSION, BENIGN: ICD-10-CM

## 2023-02-19 PROCEDURE — 99443 PR PHYS/QHP TELEPHONE EVALUATION 21-30 MIN: CPT | Performed by: INTERNAL MEDICINE

## 2023-02-19 RX ORDER — PREDNISONE 10 MG/1
10 TABLET ORAL 2 TIMES DAILY
Qty: 10 TABLET | Refills: 0 | Status: SHIPPED | OUTPATIENT
Start: 2023-02-19 | End: 2023-02-24

## 2023-02-19 RX ORDER — HYDROCODONE BITARTRATE AND HOMATROPINE METHYLBROMIDE ORAL SOLUTION 5; 1.5 MG/5ML; MG/5ML
2.5 LIQUID ORAL EVERY 6 HOURS PRN
Qty: 120 EACH | Refills: 0 | Status: SHIPPED | OUTPATIENT
Start: 2023-02-19 | End: 2023-02-24

## 2023-02-19 RX ORDER — AZITHROMYCIN 250 MG/1
250 TABLET, FILM COATED ORAL SEE ADMIN INSTRUCTIONS
Qty: 6 TABLET | Refills: 0 | Status: SHIPPED | OUTPATIENT
Start: 2023-02-19 | End: 2023-02-24

## 2023-02-19 NOTE — TELEPHONE ENCOUNTER
Dayna Sy is a 78 y.o. female evaluated via telephone on 2/19/2023 for Positive For Covid-19 (On call note. Patient called for evaluation and treatment after testing positive for Covid at home. She has a history of breast cancer and HTN. Blood pressure medications have recently been adjusted. See prior notes.  /)  . Documentation:    I communicated with the patient and/or health care decision maker about URI sx and cough and testing positive for covid with rapid home test.  No fever. No AMS. Details of this discussion including any medical advice provided:   The patient was called for notification of a POSITIVE test result for COVID-19. The following information was given to the patient:    The COVID-19 test result was positive. Mild and stable symptoms are managed at home. Day 0 is your first day of symptoms or a positive test.  Day 1 is the first day after your symptoms started or your test specimen was collected. If you have COVID-19 or have symptoms  Stay home for at least 5 days and isolate from others in your home. Wear a well-fitted mask if you must be around others in your home. End isolation after 5 full days if you are fever-free for 24 hours (without the use of fever-reducing medication) and your symptoms are improving. If you did NOT have symptoms  End isolation after at least 5 full days after your positive test.  Contact your medical provider if symptoms are worsening, such as difficulty breathing.     To prevent spreading COVID  Avoid travel and avoid being around people who are at high risk  Wash hands often with soap and water for at least 20 seconds or alternatively use hand  with at least 60% alcohol content  Cover coughs and sneezes  Wear a mask when around others    For more information visit the CDC website: DotProtection.gl   Diagnoses and all orders for this visit:  COVID-19 virus infection  -     azithromycin (ZITHROMAX) 250 MG tablet; Take 1 tablet by mouth See Admin Instructions for 5 days 500mg on day 1 followed by 250mg on days 2 - 5  -     HYDROcodone homatropine (HYCODAN) 5-1.5 MG/5ML solution; Take 2.5 mLs by mouth every 6 hours as needed (cough) for up to 5 days. Max Daily Amount: 10 mLs  -     predniSONE (DELTASONE) 10 MG tablet; Take 1 tablet by mouth 2 times daily for 5 days  -     nirmatrelvir/ritonavir 300/100 (PAXLOVID, 300/100,) 20 x 150 MG & 10 x 100MG TBPK; Take 3 tablets (two 150 mg nirmatrelvir and one 100 mg ritonavir tablets) by mouth every 12 hours for 5 days. Upper respiratory infection with cough and congestion  -     azithromycin (ZITHROMAX) 250 MG tablet; Take 1 tablet by mouth See Admin Instructions for 5 days 500mg on day 1 followed by 250mg on days 2 - 5  -     HYDROcodone homatropine (HYCODAN) 5-1.5 MG/5ML solution; Take 2.5 mLs by mouth every 6 hours as needed (cough) for up to 5 days. Max Daily Amount: 10 mLs  -     predniSONE (DELTASONE) 10 MG tablet; Take 1 tablet by mouth 2 times daily for 5 days  History of cancer of left breast  Essential hypertension, benign  1. COVID-19 virus infection  Start Paxlovid. Benefit outweighs risk IMO. No obvious contraindication. Discussed. Zpak and Prednisone as prescribed if cough worsens. She does not have to take immediately, only if symptoms progress. If she does have symptoms progression, she should contact me and or follow up in the office for exam and evaluation FTF. Cough syrup due to paroxysms of cough. Advised take at night a tsp if cough symptoms do not allow her to rest or sleep but does not need to be taken except only as needed for symptom relief. She voices understanding. Controlled Substance Monitoring:    Acute and Chronic Pain Monitoring:   RX Monitoring 2/19/2023   Periodic Controlled Substance Monitoring No signs of potential drug abuse or diversion identified. - azithromycin (ZITHROMAX) 250 MG tablet;  Take 1 tablet by mouth See Admin Instructions for 5 days 500mg on day 1 followed by 250mg on days 2 - 5  Dispense: 6 tablet; Refill: 0  - HYDROcodone homatropine (HYCODAN) 5-1.5 MG/5ML solution; Take 2.5 mLs by mouth every 6 hours as needed (cough) for up to 5 days. Max Daily Amount: 10 mLs  Dispense: 120 each; Refill: 0  - predniSONE (DELTASONE) 10 MG tablet; Take 1 tablet by mouth 2 times daily for 5 days  Dispense: 10 tablet; Refill: 0  - nirmatrelvir/ritonavir 300/100 (PAXLOVID, 300/100,) 20 x 150 MG & 10 x 100MG TBPK; Take 3 tablets (two 150 mg nirmatrelvir and one 100 mg ritonavir tablets) by mouth every 12 hours for 5 days. Dispense: 30 tablet; Refill: 0    2. Upper respiratory infection with cough and congestion  As above. - azithromycin (ZITHROMAX) 250 MG tablet; Take 1 tablet by mouth See Admin Instructions for 5 days 500mg on day 1 followed by 250mg on days 2 - 5  Dispense: 6 tablet; Refill: 0  - HYDROcodone homatropine (HYCODAN) 5-1.5 MG/5ML solution; Take 2.5 mLs by mouth every 6 hours as needed (cough) for up to 5 days. Max Daily Amount: 10 mLs  Dispense: 120 each; Refill: 0  - predniSONE (DELTASONE) 10 MG tablet; Take 1 tablet by mouth 2 times daily for 5 days  Dispense: 10 tablet; Refill: 0    3. History of cancer of left breast  As above     4. Essential hypertension, benign  As above. Total Time: 21 minutes    Dayna Butt was evaluated through a synchronous (real-time) audio encounter. Patient identification was verified at the start of the visit. She (or guardian if applicable) is aware that this is a billable service, which includes applicable co-pays. This visit was conducted with the patient's (and/or legal guardian's) verbal consent. She has not had a related appointment within my department in the past 7 days or scheduled within the next 24 hours. The patient was located at Home: [address] [place] [phone].   The provider was located at Home ([facility]): Shayne Carlisle JATIN Moctezuma MD

## 2023-02-21 ENCOUNTER — TELEPHONE (OUTPATIENT)
Dept: INTERNAL MEDICINE CLINIC | Facility: CLINIC | Age: 68
End: 2023-02-21

## 2023-02-21 RX ORDER — ONDANSETRON 4 MG/1
4 TABLET, ORALLY DISINTEGRATING ORAL 3 TIMES DAILY PRN
Qty: 21 TABLET | Refills: 0 | Status: SHIPPED | OUTPATIENT
Start: 2023-02-21

## 2023-02-21 NOTE — TELEPHONE ENCOUNTER
Patient states that she has COVID and spoke with Dr Fadumo Ramires over the weekend (on call).  She states that she has been vomiting and would like a nausea medication sent to pharmacy,preferably sublingual.

## 2023-02-21 NOTE — TELEPHONE ENCOUNTER
Orders Placed This Encounter    ondansetron (ZOFRAN-ODT) 4 MG disintegrating tablet     Sig: Take 1 tablet by mouth 3 times daily as needed for Nausea or Vomiting     Dispense:  21 tablet     Refill:  0         Dov AValeria Young MD

## 2023-05-03 ENCOUNTER — HOSPITAL ENCOUNTER (OUTPATIENT)
Dept: LAB | Age: 68
Discharge: HOME | End: 2023-05-06

## 2023-05-03 DIAGNOSIS — R73.01 ELEVATED FASTING GLUCOSE: ICD-10-CM

## 2023-05-03 DIAGNOSIS — E07.9 THYROID DISEASE: ICD-10-CM

## 2023-05-03 DIAGNOSIS — I10 ESSENTIAL HYPERTENSION, BENIGN: ICD-10-CM

## 2023-05-03 DIAGNOSIS — Z85.3 HISTORY OF CANCER OF LEFT BREAST: ICD-10-CM

## 2023-05-03 DIAGNOSIS — F41.1 ANXIETY STATE: ICD-10-CM

## 2023-05-03 LAB
ALBUMIN SERPL-MCNC: 3.8 G/DL (ref 3.2–4.6)
ALBUMIN/GLOB SERPL: 1.2 (ref 0.4–1.6)
ALP SERPL-CCNC: 85 U/L (ref 50–136)
ALT SERPL-CCNC: 44 U/L (ref 12–65)
ANION GAP SERPL CALC-SCNC: 4 MMOL/L (ref 2–11)
AST SERPL-CCNC: 31 U/L (ref 15–37)
BILIRUB SERPL-MCNC: 0.8 MG/DL (ref 0.2–1.1)
BUN SERPL-MCNC: 20 MG/DL (ref 8–23)
CALCIUM SERPL-MCNC: 9.3 MG/DL (ref 8.3–10.4)
CHLORIDE SERPL-SCNC: 104 MMOL/L (ref 101–110)
CHOLEST SERPL-MCNC: 231 MG/DL
CO2 SERPL-SCNC: 26 MMOL/L (ref 21–32)
CREAT SERPL-MCNC: 0.9 MG/DL (ref 0.6–1)
ERYTHROCYTE [DISTWIDTH] IN BLOOD BY AUTOMATED COUNT: 14.4 % (ref 11.9–14.6)
EST. AVERAGE GLUCOSE BLD GHB EST-MCNC: 126 MG/DL
GLOBULIN SER CALC-MCNC: 3.3 G/DL (ref 2.8–4.5)
GLUCOSE SERPL-MCNC: 107 MG/DL (ref 65–100)
HBA1C MFR BLD: 6 % (ref 4.8–5.6)
HCT VFR BLD AUTO: 45.1 % (ref 35.8–46.3)
HDLC SERPL-MCNC: 79 MG/DL (ref 40–60)
HDLC SERPL: 2.9
HGB BLD-MCNC: 14.8 G/DL (ref 11.7–15.4)
LDLC SERPL CALC-MCNC: 123.8 MG/DL
MCH RBC QN AUTO: 29.4 PG (ref 26.1–32.9)
MCHC RBC AUTO-ENTMCNC: 32.8 G/DL (ref 31.4–35)
MCV RBC AUTO: 89.5 FL (ref 82–102)
NRBC # BLD: 0 K/UL (ref 0–0.2)
PLATELET # BLD AUTO: 298 K/UL (ref 150–450)
PMV BLD AUTO: 9.8 FL (ref 9.4–12.3)
POTASSIUM SERPL-SCNC: 4.3 MMOL/L (ref 3.5–5.1)
PROT SERPL-MCNC: 7.1 G/DL (ref 6.3–8.2)
RBC # BLD AUTO: 5.04 M/UL (ref 4.05–5.2)
SODIUM SERPL-SCNC: 134 MMOL/L (ref 133–143)
TRIGL SERPL-MCNC: 141 MG/DL (ref 35–150)
TSH, 3RD GENERATION: 1.83 UIU/ML (ref 0.36–3.74)
VLDLC SERPL CALC-MCNC: 28.2 MG/DL (ref 6–23)
WBC # BLD AUTO: 5.3 K/UL (ref 4.3–11.1)

## 2023-05-08 SDOH — ECONOMIC STABILITY: FOOD INSECURITY: WITHIN THE PAST 12 MONTHS, YOU WORRIED THAT YOUR FOOD WOULD RUN OUT BEFORE YOU GOT MONEY TO BUY MORE.: NEVER TRUE

## 2023-05-08 SDOH — ECONOMIC STABILITY: TRANSPORTATION INSECURITY
IN THE PAST 12 MONTHS, HAS LACK OF TRANSPORTATION KEPT YOU FROM MEETINGS, WORK, OR FROM GETTING THINGS NEEDED FOR DAILY LIVING?: NO

## 2023-05-08 SDOH — ECONOMIC STABILITY: INCOME INSECURITY: HOW HARD IS IT FOR YOU TO PAY FOR THE VERY BASICS LIKE FOOD, HOUSING, MEDICAL CARE, AND HEATING?: NOT HARD AT ALL

## 2023-05-08 SDOH — ECONOMIC STABILITY: FOOD INSECURITY: WITHIN THE PAST 12 MONTHS, THE FOOD YOU BOUGHT JUST DIDN'T LAST AND YOU DIDN'T HAVE MONEY TO GET MORE.: NEVER TRUE

## 2023-05-08 SDOH — ECONOMIC STABILITY: HOUSING INSECURITY
IN THE LAST 12 MONTHS, WAS THERE A TIME WHEN YOU DID NOT HAVE A STEADY PLACE TO SLEEP OR SLEPT IN A SHELTER (INCLUDING NOW)?: NO

## 2023-05-11 ENCOUNTER — OFFICE VISIT (OUTPATIENT)
Dept: INTERNAL MEDICINE CLINIC | Facility: CLINIC | Age: 68
End: 2023-05-11
Payer: MEDICARE

## 2023-05-11 VITALS
BODY MASS INDEX: 29.09 KG/M2 | HEART RATE: 84 BPM | OXYGEN SATURATION: 97 % | HEIGHT: 66 IN | DIASTOLIC BLOOD PRESSURE: 64 MMHG | TEMPERATURE: 97.5 F | SYSTOLIC BLOOD PRESSURE: 120 MMHG | WEIGHT: 181 LBS

## 2023-05-11 DIAGNOSIS — Z85.3 HISTORY OF CANCER OF LEFT BREAST: ICD-10-CM

## 2023-05-11 DIAGNOSIS — E07.9 THYROID DISEASE: ICD-10-CM

## 2023-05-11 DIAGNOSIS — R73.01 ELEVATED FASTING GLUCOSE: ICD-10-CM

## 2023-05-11 DIAGNOSIS — F41.1 ANXIETY STATE: ICD-10-CM

## 2023-05-11 DIAGNOSIS — Z63.6 CAREGIVER STRESS: Primary | ICD-10-CM

## 2023-05-11 DIAGNOSIS — I10 ESSENTIAL HYPERTENSION, BENIGN: ICD-10-CM

## 2023-05-11 PROCEDURE — 1123F ACP DISCUSS/DSCN MKR DOCD: CPT | Performed by: INTERNAL MEDICINE

## 2023-05-11 PROCEDURE — 3078F DIAST BP <80 MM HG: CPT | Performed by: INTERNAL MEDICINE

## 2023-05-11 PROCEDURE — 3074F SYST BP LT 130 MM HG: CPT | Performed by: INTERNAL MEDICINE

## 2023-05-11 PROCEDURE — 99213 OFFICE O/P EST LOW 20 MIN: CPT | Performed by: INTERNAL MEDICINE

## 2023-05-11 RX ORDER — LIOTHYRONINE SODIUM 5 UG/1
5 TABLET ORAL DAILY
Qty: 90 TABLET | Refills: 3 | Status: SHIPPED | OUTPATIENT
Start: 2023-05-11

## 2023-05-11 RX ORDER — LEVOTHYROXINE SODIUM 0.07 MG/1
75 TABLET ORAL
Qty: 90 TABLET | Refills: 3 | Status: SHIPPED | OUTPATIENT
Start: 2023-05-11

## 2023-05-11 RX ORDER — LORAZEPAM 0.5 MG/1
0.5 TABLET ORAL EVERY 6 HOURS PRN
Qty: 90 TABLET | Refills: 0 | Status: SHIPPED | OUTPATIENT
Start: 2023-05-11 | End: 2023-06-10

## 2023-05-11 RX ORDER — OMEPRAZOLE 20 MG/1
CAPSULE, DELAYED RELEASE ORAL PRN
COMMUNITY

## 2023-05-11 SDOH — SOCIAL STABILITY - SOCIAL INSECURITY: DEPENDENT RELATIVE NEEDING CARE AT HOME: Z63.6

## 2023-05-11 ASSESSMENT — ENCOUNTER SYMPTOMS
CHEST TIGHTNESS: 0
SHORTNESS OF BREATH: 0

## 2023-05-11 NOTE — PROGRESS NOTES
Value Date/Time    LABA1C 6.0 05/03/2023 08:04 AM    LABA1C 6.1 11/01/2022 08:41 AM    LABA1C 6.2 04/21/2022 11:22 AM       Lab Results   Component Value Date/Time    TSH 1.780 04/21/2022 11:22 AM       Results for orders placed or performed in visit on 05/03/23 (from the past 2160 hour(s))   Hemoglobin A1C   Result Value Ref Range    Hemoglobin A1C 6.0 (H) 4.8 - 5.6 %    eAG 126 mg/dL   TSH   Result Value Ref Range    TSH, 3RD GENERATION 1.830 0.358 - 3.740 uIU/mL   Lipid Panel   Result Value Ref Range    Cholesterol, Total 231 (H) <200 MG/DL    Triglycerides 141 35 - 150 MG/DL    HDL 79 (H) 40 - 60 MG/DL    LDL Calculated 123.8 (H) <100 MG/DL    VLDL Cholesterol Calculated 28.2 (H) 6.0 - 23.0 MG/DL    Chol/HDL Ratio 2.9     CBC   Result Value Ref Range    WBC 5.3 4.3 - 11.1 K/uL    RBC 5.04 4.05 - 5.2 M/uL    Hemoglobin 14.8 11.7 - 15.4 g/dL    Hematocrit 45.1 35.8 - 46.3 %    MCV 89.5 82 - 102 FL    MCH 29.4 26.1 - 32.9 PG    MCHC 32.8 31.4 - 35.0 g/dL    RDW 14.4 11.9 - 14.6 %    Platelets 506 745 - 695 K/uL    MPV 9.8 9.4 - 12.3 FL    nRBC 0.00 0.0 - 0.2 K/uL   Comprehensive Metabolic Panel   Result Value Ref Range    Sodium 134 133 - 143 mmol/L    Potassium 4.3 3.5 - 5.1 mmol/L    Chloride 104 101 - 110 mmol/L    CO2 26 21 - 32 mmol/L    Anion Gap 4 2 - 11 mmol/L    Glucose 107 (H) 65 - 100 mg/dL    BUN 20 8 - 23 MG/DL    Creatinine 0.90 0.6 - 1.0 MG/DL    Est, Glom Filt Rate >60 >60 ml/min/1.73m2    Calcium 9.3 8.3 - 10.4 MG/DL    Total Bilirubin 0.8 0.2 - 1.1 MG/DL    ALT 44 12 - 65 U/L    AST 31 15 - 37 U/L    Alk Phosphatase 85 50 - 136 U/L    Total Protein 7.1 6.3 - 8.2 g/dL    Albumin 3.8 3.2 - 4.6 g/dL    Globulin 3.3 2.8 - 4.5 g/dL    Albumin/Globulin Ratio 1.2 0.4 - 1.6           Vitals:    05/11/23 0829   BP: 120/64   Site: Left Upper Arm   Position: Sitting   Cuff Size: Small Adult   Pulse: 84   Temp: 97.5 °F (36.4 °C)   TempSrc: Skin   SpO2: 97%   Weight: 181 lb (82.1 kg)   Height: 5' 6\" (1.676 m)

## 2023-08-12 DIAGNOSIS — I10 ESSENTIAL HYPERTENSION, BENIGN: ICD-10-CM

## 2023-08-14 RX ORDER — BENAZEPRIL HYDROCHLORIDE AND HYDROCHLOROTHIAZIDE 20; 12.5 MG/1; MG/1
TABLET ORAL
Qty: 90 TABLET | Refills: 1 | OUTPATIENT
Start: 2023-08-14

## 2023-08-14 RX ORDER — BENAZEPRIL HYDROCHLORIDE AND HYDROCHLOROTHIAZIDE 20; 12.5 MG/1; MG/1
1 TABLET ORAL DAILY
Qty: 90 TABLET | Refills: 3 | Status: SHIPPED | OUTPATIENT
Start: 2023-08-14

## 2023-11-07 DIAGNOSIS — I10 ESSENTIAL HYPERTENSION, BENIGN: Primary | ICD-10-CM

## 2023-11-07 DIAGNOSIS — R73.01 ELEVATED FASTING GLUCOSE: ICD-10-CM

## 2023-11-07 DIAGNOSIS — E07.9 THYROID DISEASE: ICD-10-CM

## 2023-11-09 ENCOUNTER — HOSPITAL ENCOUNTER (OUTPATIENT)
Dept: LAB | Age: 68
Discharge: HOME | End: 2023-11-12

## 2023-11-09 DIAGNOSIS — E07.9 THYROID DISEASE: ICD-10-CM

## 2023-11-09 DIAGNOSIS — I10 ESSENTIAL HYPERTENSION, BENIGN: ICD-10-CM

## 2023-11-09 DIAGNOSIS — R73.01 ELEVATED FASTING GLUCOSE: ICD-10-CM

## 2023-11-09 LAB
ALBUMIN SERPL-MCNC: 3.7 G/DL (ref 3.2–4.6)
ALBUMIN/GLOB SERPL: 1.2 (ref 0.4–1.6)
ALP SERPL-CCNC: 84 U/L (ref 50–136)
ALT SERPL-CCNC: 31 U/L (ref 12–65)
ANION GAP SERPL CALC-SCNC: 11 MMOL/L (ref 2–11)
AST SERPL-CCNC: 19 U/L (ref 15–37)
BASOPHILS # BLD: 0.1 K/UL (ref 0–0.2)
BASOPHILS NFR BLD: 1 % (ref 0–2)
BILIRUB SERPL-MCNC: 0.8 MG/DL (ref 0.2–1.1)
BUN SERPL-MCNC: 12 MG/DL (ref 8–23)
CALCIUM SERPL-MCNC: 9.3 MG/DL (ref 8.3–10.4)
CHLORIDE SERPL-SCNC: 105 MMOL/L (ref 101–110)
CHOLEST SERPL-MCNC: 209 MG/DL
CO2 SERPL-SCNC: 21 MMOL/L (ref 21–32)
CREAT SERPL-MCNC: 1.1 MG/DL (ref 0.6–1)
DIFFERENTIAL METHOD BLD: NORMAL
EOSINOPHIL # BLD: 0.2 K/UL (ref 0–0.8)
EOSINOPHIL NFR BLD: 4 % (ref 0.5–7.8)
ERYTHROCYTE [DISTWIDTH] IN BLOOD BY AUTOMATED COUNT: 14.2 % (ref 11.9–14.6)
GLOBULIN SER CALC-MCNC: 3.1 G/DL (ref 2.8–4.5)
GLUCOSE SERPL-MCNC: 121 MG/DL (ref 65–100)
HCT VFR BLD AUTO: 43.6 % (ref 35.8–46.3)
HDLC SERPL-MCNC: 78 MG/DL (ref 40–60)
HDLC SERPL: 2.7
HGB BLD-MCNC: 14 G/DL (ref 11.7–15.4)
IMM GRANULOCYTES # BLD AUTO: 0 K/UL (ref 0–0.5)
IMM GRANULOCYTES NFR BLD AUTO: 0 % (ref 0–5)
LDLC SERPL CALC-MCNC: 109.2 MG/DL
LYMPHOCYTES # BLD: 2.1 K/UL (ref 0.5–4.6)
LYMPHOCYTES NFR BLD: 30 % (ref 13–44)
MCH RBC QN AUTO: 29 PG (ref 26.1–32.9)
MCHC RBC AUTO-ENTMCNC: 32.1 G/DL (ref 31.4–35)
MCV RBC AUTO: 90.3 FL (ref 82–102)
MONOCYTES # BLD: 0.5 K/UL (ref 0.1–1.3)
MONOCYTES NFR BLD: 7 % (ref 4–12)
NEUTS SEG # BLD: 4 K/UL (ref 1.7–8.2)
NEUTS SEG NFR BLD: 58 % (ref 43–78)
NRBC # BLD: 0 K/UL (ref 0–0.2)
PLATELET # BLD AUTO: 327 K/UL (ref 150–450)
PMV BLD AUTO: 10.6 FL (ref 9.4–12.3)
POTASSIUM SERPL-SCNC: 4.5 MMOL/L (ref 3.5–5.1)
PROT SERPL-MCNC: 6.8 G/DL (ref 6.3–8.2)
RBC # BLD AUTO: 4.83 M/UL (ref 4.05–5.2)
SODIUM SERPL-SCNC: 137 MMOL/L (ref 133–143)
TRIGL SERPL-MCNC: 109 MG/DL (ref 35–150)
TSH, 3RD GENERATION: 1.6 UIU/ML (ref 0.36–3.74)
VLDLC SERPL CALC-MCNC: 21.8 MG/DL (ref 6–23)
WBC # BLD AUTO: 6.9 K/UL (ref 4.3–11.1)

## 2023-11-10 LAB
EST. AVERAGE GLUCOSE BLD GHB EST-MCNC: 126 MG/DL
HBA1C MFR BLD: 6 % (ref 4.8–5.6)

## 2023-11-13 SDOH — HEALTH STABILITY: PHYSICAL HEALTH: ON AVERAGE, HOW MANY DAYS PER WEEK DO YOU ENGAGE IN MODERATE TO STRENUOUS EXERCISE (LIKE A BRISK WALK)?: 5 DAYS

## 2023-11-13 SDOH — HEALTH STABILITY: PHYSICAL HEALTH: ON AVERAGE, HOW MANY MINUTES DO YOU ENGAGE IN EXERCISE AT THIS LEVEL?: 60 MIN

## 2023-11-13 ASSESSMENT — PATIENT HEALTH QUESTIONNAIRE - PHQ9
SUM OF ALL RESPONSES TO PHQ9 QUESTIONS 1 & 2: 0
SUM OF ALL RESPONSES TO PHQ QUESTIONS 1-9: 0
1. LITTLE INTEREST OR PLEASURE IN DOING THINGS: 0
SUM OF ALL RESPONSES TO PHQ QUESTIONS 1-9: 0
2. FEELING DOWN, DEPRESSED OR HOPELESS: 0

## 2023-11-13 ASSESSMENT — LIFESTYLE VARIABLES
HOW OFTEN DO YOU HAVE A DRINK CONTAINING ALCOHOL: 3
HOW OFTEN DO YOU HAVE SIX OR MORE DRINKS ON ONE OCCASION: 1
HOW OFTEN DO YOU HAVE A DRINK CONTAINING ALCOHOL: 2-4 TIMES A MONTH
HOW MANY STANDARD DRINKS CONTAINING ALCOHOL DO YOU HAVE ON A TYPICAL DAY: 1
HOW MANY STANDARD DRINKS CONTAINING ALCOHOL DO YOU HAVE ON A TYPICAL DAY: 1 OR 2

## 2023-11-16 ENCOUNTER — OFFICE VISIT (OUTPATIENT)
Dept: INTERNAL MEDICINE CLINIC | Facility: CLINIC | Age: 68
End: 2023-11-16
Payer: MEDICARE

## 2023-11-16 VITALS
BODY MASS INDEX: 30.37 KG/M2 | TEMPERATURE: 97.3 F | DIASTOLIC BLOOD PRESSURE: 66 MMHG | SYSTOLIC BLOOD PRESSURE: 124 MMHG | HEIGHT: 66 IN | WEIGHT: 189 LBS | OXYGEN SATURATION: 98 % | HEART RATE: 74 BPM

## 2023-11-16 DIAGNOSIS — I10 ESSENTIAL HYPERTENSION, BENIGN: ICD-10-CM

## 2023-11-16 DIAGNOSIS — Z63.6 CAREGIVER STRESS: ICD-10-CM

## 2023-11-16 DIAGNOSIS — Z00.00 MEDICARE ANNUAL WELLNESS VISIT, SUBSEQUENT: Primary | ICD-10-CM

## 2023-11-16 DIAGNOSIS — Z23 NEEDS FLU SHOT: ICD-10-CM

## 2023-11-16 DIAGNOSIS — Z72.89 OTHER PROBLEMS RELATED TO LIFESTYLE: ICD-10-CM

## 2023-11-16 DIAGNOSIS — F41.1 ANXIETY STATE: ICD-10-CM

## 2023-11-16 DIAGNOSIS — Z12.31 ENCOUNTER FOR SCREENING MAMMOGRAM FOR BREAST CANCER: ICD-10-CM

## 2023-11-16 DIAGNOSIS — E07.9 THYROID DISEASE: ICD-10-CM

## 2023-11-16 DIAGNOSIS — Z11.59 NEED FOR HEPATITIS C SCREENING TEST: ICD-10-CM

## 2023-11-16 PROCEDURE — 3078F DIAST BP <80 MM HG: CPT | Performed by: INTERNAL MEDICINE

## 2023-11-16 PROCEDURE — 3074F SYST BP LT 130 MM HG: CPT | Performed by: INTERNAL MEDICINE

## 2023-11-16 PROCEDURE — 1123F ACP DISCUSS/DSCN MKR DOCD: CPT | Performed by: INTERNAL MEDICINE

## 2023-11-16 PROCEDURE — G0439 PPPS, SUBSEQ VISIT: HCPCS | Performed by: INTERNAL MEDICINE

## 2023-11-16 RX ORDER — LEVOTHYROXINE SODIUM 0.07 MG/1
75 TABLET ORAL
Qty: 90 TABLET | Refills: 3 | Status: SHIPPED | OUTPATIENT
Start: 2023-11-16

## 2023-11-16 RX ORDER — BENAZEPRIL HYDROCHLORIDE AND HYDROCHLOROTHIAZIDE 20; 12.5 MG/1; MG/1
1 TABLET ORAL DAILY
Qty: 90 TABLET | Refills: 3 | Status: SHIPPED | OUTPATIENT
Start: 2023-11-16

## 2023-11-16 RX ORDER — LIOTHYRONINE SODIUM 5 UG/1
5 TABLET ORAL DAILY
Qty: 90 TABLET | Refills: 3 | Status: SHIPPED | OUTPATIENT
Start: 2023-11-16

## 2023-11-16 RX ORDER — LORAZEPAM 0.5 MG/1
0.5 TABLET ORAL EVERY 6 HOURS PRN
Qty: 90 TABLET | Refills: 1 | Status: SHIPPED | OUTPATIENT
Start: 2023-11-16 | End: 2024-02-14

## 2023-11-16 SDOH — SOCIAL STABILITY - SOCIAL INSECURITY: DEPENDENT RELATIVE NEEDING CARE AT HOME: Z63.6

## 2023-11-17 ENCOUNTER — TRANSCRIBE ORDERS (OUTPATIENT)
Dept: SCHEDULING | Age: 68
End: 2023-11-17

## 2023-11-17 DIAGNOSIS — Z12.31 VISIT FOR SCREENING MAMMOGRAM: Primary | ICD-10-CM

## 2023-11-20 ENCOUNTER — HOSPITAL ENCOUNTER (OUTPATIENT)
Dept: MAMMOGRAPHY | Age: 68
Discharge: HOME | End: 2023-11-23
Attending: INTERNAL MEDICINE

## 2023-11-20 DIAGNOSIS — Z12.31 VISIT FOR SCREENING MAMMOGRAM: ICD-10-CM

## 2023-11-22 PROCEDURE — 90694 VACC AIIV4 NO PRSRV 0.5ML IM: CPT | Performed by: INTERNAL MEDICINE

## 2023-11-22 PROCEDURE — G0008 ADMIN INFLUENZA VIRUS VAC: HCPCS | Performed by: INTERNAL MEDICINE

## 2024-04-22 NOTE — PERIOP NOTE
Patient verified name and .  Order for consent NOT found in EHR at time of PAT visit. Unable to verify case posting against order; surgery verified by patient.    Type 1B surgery, phone assessment complete.  Orders not received.  Labs per surgeon: none ordered  Labs per anesthesia protocol: K+ s/h for DOS    Patient answered medical/surgical history questions at their best of ability. All prior to admission medications documented in EPIC.    Patient instructed to continue taking all prescription medications up to the day of surgery but to take only the following medications the day of surgery according to anesthesia guidelines with a small sip of water: lorazepam if needed, levothyroxine, cytomel, omeprazole if needed.  Also, patient is requested to take 2 Tylenol in the morning and then again before bed on the day before surgery. Regular or extra strength may be used.       Patient informed that all vitamins and supplements should be held 7 days prior to surgery and NSAIDS 5 days prior to surgery. Prescription meds to hold:  none    Patient instructed on the following:    > Arrive at [facility] Entr[facility], time of arrival to be called the day before by 1700  > NPO after midnight, unless otherwise indicated, including gum, mints, and ice chips  > Responsible adult must drive patient to the hospital, stay during surgery, and patient will need supervision 24 hours after anesthesia  > Use non moisturizing soap in shower the night before surgery and on the morning of surgery  > All piercings must be removed prior to arrival.    > Leave all valuables (money and jewelry) at home but bring insurance card and ID on DOS.   > You may be required to pay a deductible or co-pay on the day of your procedure. You can pre-pay by calling [phone] if your surgery is at the [facility] or [phone] if your surgery is at the [facility].  > Do not wear make-up, nail polish, lotions, cologne, perfumes, powders, or oil on skin.

## 2024-04-24 ENCOUNTER — PREP FOR PROCEDURE (OUTPATIENT)
Dept: ADMINISTRATIVE | Age: 69
End: 2024-04-24

## 2024-04-24 ENCOUNTER — ANESTHESIA EVENT (OUTPATIENT)
Dept: SURGERY | Age: 69
End: 2024-04-24
Payer: MEDICARE

## 2024-04-24 RX ORDER — CYCLOPENTOLATE HYDROCHLORIDE 20 MG/ML
1 SOLUTION/ DROPS OPHTHALMIC
Status: CANCELLED | OUTPATIENT
Start: 2024-04-24 | End: 2024-04-24

## 2024-04-24 RX ORDER — PHENYLEPHRINE HYDROCHLORIDE 25 MG/ML
1 SOLUTION/ DROPS OPHTHALMIC
Status: CANCELLED | OUTPATIENT
Start: 2024-04-24 | End: 2024-04-24

## 2024-04-24 NOTE — PERIOP NOTE
Preop department called to notify patient of arrival time for scheduled procedure. Instructions given to   - Arrive at [facility] Entrance [address].  - Remain NPO after midnight, unless otherwise indicated, including gum, mints, and ice chips.   - Have a responsible adult to drive patient to the hospital, stay during surgery, and patient will need supervision 24 hours after anesthesia.   - Use antibacterial soap in shower the night before surgery and on the morning of surgery.       Was patient contacted: yes, spoke to patient.   Voicemail left:   Numbers contacted: [phone]   Arrival time: 1100

## 2024-04-24 NOTE — PERIOP NOTE
Preop department called to notify patient of arrival time for scheduled procedure. Instructions given to   - Arrive at [facility] Entrance [address].  - Remain NPO after midnight, unless otherwise indicated, including gum, mints, and ice chips.   - Have a responsible adult to drive patient to the hospital, stay during surgery, and patient will need supervision 24 hours after anesthesia.   - Use antibacterial soap in shower the night before surgery and on the morning of surgery.       Was patient contacted: no  Voicemail left: yes  Numbers contacted: [phone]   Arrival time: 1100

## 2024-04-25 ENCOUNTER — HOSPITAL ENCOUNTER (OUTPATIENT)
Age: 69
Setting detail: OUTPATIENT SURGERY
Discharge: HOME | End: 2024-04-25
Attending: OPHTHALMOLOGY | Admitting: OPHTHALMOLOGY
Payer: MEDICARE

## 2024-04-25 ENCOUNTER — ANESTHESIA (OUTPATIENT)
Dept: SURGERY | Age: 69
End: 2024-04-25
Payer: MEDICARE

## 2024-04-25 VITALS
DIASTOLIC BLOOD PRESSURE: 70 MMHG | HEIGHT: 67 IN | RESPIRATION RATE: 22 BRPM | WEIGHT: 190 LBS | BODY MASS INDEX: 29.82 KG/M2 | SYSTOLIC BLOOD PRESSURE: 138 MMHG | OXYGEN SATURATION: 97 % | HEART RATE: 73 BPM | TEMPERATURE: 97 F

## 2024-04-25 LAB — POTASSIUM BLD-SCNC: 4.5 MMOL/L (ref 3.5–5.1)

## 2024-04-25 PROCEDURE — 84132 ASSAY OF SERUM POTASSIUM: CPT

## 2024-04-25 PROCEDURE — 2709999900 HC NON-CHARGEABLE SUPPLY: Performed by: OPHTHALMOLOGY

## 2024-04-25 PROCEDURE — 7100000001 HC PACU RECOVERY - ADDTL 15 MIN: Performed by: OPHTHALMOLOGY

## 2024-04-25 PROCEDURE — 6360000002 HC RX W HCPCS: Performed by: NURSE ANESTHETIST, CERTIFIED REGISTERED

## 2024-04-25 PROCEDURE — 6370000000 HC RX 637 (ALT 250 FOR IP): Performed by: OPHTHALMOLOGY

## 2024-04-25 PROCEDURE — 3600000014 HC SURGERY LEVEL 4 ADDTL 15MIN: Performed by: OPHTHALMOLOGY

## 2024-04-25 PROCEDURE — 2500000003 HC RX 250 WO HCPCS: Performed by: NURSE ANESTHETIST, CERTIFIED REGISTERED

## 2024-04-25 PROCEDURE — 2720000010 HC SURG SUPPLY STERILE: Performed by: OPHTHALMOLOGY

## 2024-04-25 PROCEDURE — 3700000001 HC ADD 15 MINUTES (ANESTHESIA): Performed by: OPHTHALMOLOGY

## 2024-04-25 PROCEDURE — 2500000003 HC RX 250 WO HCPCS: Performed by: OPHTHALMOLOGY

## 2024-04-25 PROCEDURE — 3600000004 HC SURGERY LEVEL 4 BASE: Performed by: OPHTHALMOLOGY

## 2024-04-25 PROCEDURE — 2580000003 HC RX 258: Performed by: ANESTHESIOLOGY

## 2024-04-25 PROCEDURE — 3700000000 HC ANESTHESIA ATTENDED CARE: Performed by: OPHTHALMOLOGY

## 2024-04-25 PROCEDURE — 7100000000 HC PACU RECOVERY - FIRST 15 MIN: Performed by: OPHTHALMOLOGY

## 2024-04-25 PROCEDURE — 7100000010 HC PHASE II RECOVERY - FIRST 15 MIN: Performed by: OPHTHALMOLOGY

## 2024-04-25 PROCEDURE — 6360000002 HC RX W HCPCS: Performed by: OPHTHALMOLOGY

## 2024-04-25 RX ORDER — HYDROMORPHONE HYDROCHLORIDE 2 MG/ML
0.5 INJECTION, SOLUTION INTRAMUSCULAR; INTRAVENOUS; SUBCUTANEOUS EVERY 10 MIN PRN
Status: DISCONTINUED | OUTPATIENT
Start: 2024-04-25 | End: 2024-04-25 | Stop reason: HOSPADM

## 2024-04-25 RX ORDER — OXYCODONE HYDROCHLORIDE 5 MG/1
5 TABLET ORAL
Status: DISCONTINUED | OUTPATIENT
Start: 2024-04-25 | End: 2024-04-25 | Stop reason: HOSPADM

## 2024-04-25 RX ORDER — LIDOCAINE HYDROCHLORIDE 20 MG/ML
INJECTION, SOLUTION INFILTRATION; PERINEURAL PRN
Status: DISCONTINUED | OUTPATIENT
Start: 2024-04-25 | End: 2024-04-25 | Stop reason: ALTCHOICE

## 2024-04-25 RX ORDER — MIDAZOLAM HYDROCHLORIDE 1 MG/ML
INJECTION INTRAMUSCULAR; INTRAVENOUS PRN
Status: DISCONTINUED | OUTPATIENT
Start: 2024-04-25 | End: 2024-04-25 | Stop reason: SDUPTHER

## 2024-04-25 RX ORDER — SODIUM CHLORIDE, POTASSIUM CHLORIDE, CALCIUM CHLORIDE, MAGNESIUM CHLORIDE, SODIUM ACETATE, AND SODIUM CITRATE 6.4; .75; .48; .3; 3.9; 1.7 MG/ML; MG/ML; MG/ML; MG/ML; MG/ML; MG/ML
SOLUTION IRRIGATION PRN
Status: DISCONTINUED | OUTPATIENT
Start: 2024-04-25 | End: 2024-04-25 | Stop reason: ALTCHOICE

## 2024-04-25 RX ORDER — FENTANYL CITRATE 50 UG/ML
INJECTION, SOLUTION INTRAMUSCULAR; INTRAVENOUS PRN
Status: DISCONTINUED | OUTPATIENT
Start: 2024-04-25 | End: 2024-04-25 | Stop reason: SDUPTHER

## 2024-04-25 RX ORDER — NALOXONE HYDROCHLORIDE 0.4 MG/ML
INJECTION, SOLUTION INTRAMUSCULAR; INTRAVENOUS; SUBCUTANEOUS PRN
Status: DISCONTINUED | OUTPATIENT
Start: 2024-04-25 | End: 2024-04-25 | Stop reason: HOSPADM

## 2024-04-25 RX ORDER — PROPOFOL 10 MG/ML
INJECTION, EMULSION INTRAVENOUS PRN
Status: DISCONTINUED | OUTPATIENT
Start: 2024-04-25 | End: 2024-04-25 | Stop reason: SDUPTHER

## 2024-04-25 RX ORDER — IPRATROPIUM BROMIDE AND ALBUTEROL SULFATE 2.5; .5 MG/3ML; MG/3ML
1 SOLUTION RESPIRATORY (INHALATION)
Status: DISCONTINUED | OUTPATIENT
Start: 2024-04-25 | End: 2024-04-25 | Stop reason: HOSPADM

## 2024-04-25 RX ORDER — PHENYLEPHRINE HYDROCHLORIDE 25 MG/ML
1 SOLUTION/ DROPS OPHTHALMIC
Status: COMPLETED | OUTPATIENT
Start: 2024-04-25 | End: 2024-04-25

## 2024-04-25 RX ORDER — LIDOCAINE HYDROCHLORIDE 10 MG/ML
1 INJECTION, SOLUTION INFILTRATION; PERINEURAL
Status: DISCONTINUED | OUTPATIENT
Start: 2024-04-25 | End: 2024-04-25 | Stop reason: HOSPADM

## 2024-04-25 RX ORDER — BUPIVACAINE HYDROCHLORIDE 5 MG/ML
INJECTION, SOLUTION EPIDURAL; INTRACAUDAL PRN
Status: DISCONTINUED | OUTPATIENT
Start: 2024-04-25 | End: 2024-04-25 | Stop reason: ALTCHOICE

## 2024-04-25 RX ORDER — FENTANYL CITRATE 50 UG/ML
50 INJECTION, SOLUTION INTRAMUSCULAR; INTRAVENOUS EVERY 5 MIN PRN
Status: DISCONTINUED | OUTPATIENT
Start: 2024-04-25 | End: 2024-04-25 | Stop reason: HOSPADM

## 2024-04-25 RX ORDER — CEFAZOLIN SODIUM 1 G/3ML
INJECTION, POWDER, FOR SOLUTION INTRAMUSCULAR; INTRAVENOUS PRN
Status: DISCONTINUED | OUTPATIENT
Start: 2024-04-25 | End: 2024-04-25 | Stop reason: ALTCHOICE

## 2024-04-25 RX ORDER — CYCLOPENTOLATE HYDROCHLORIDE 20 MG/ML
1 SOLUTION/ DROPS OPHTHALMIC
Status: COMPLETED | OUTPATIENT
Start: 2024-04-25 | End: 2024-04-25

## 2024-04-25 RX ORDER — SODIUM CHLORIDE 0.9 % (FLUSH) 0.9 %
5-40 SYRINGE (ML) INJECTION EVERY 12 HOURS SCHEDULED
Status: DISCONTINUED | OUTPATIENT
Start: 2024-04-25 | End: 2024-04-25 | Stop reason: HOSPADM

## 2024-04-25 RX ORDER — LIDOCAINE HYDROCHLORIDE 20 MG/ML
INJECTION, SOLUTION EPIDURAL; INFILTRATION; INTRACAUDAL; PERINEURAL PRN
Status: DISCONTINUED | OUTPATIENT
Start: 2024-04-25 | End: 2024-04-25 | Stop reason: SDUPTHER

## 2024-04-25 RX ORDER — NEOMYCIN SULFATE, POLYMYXIN B SULFATE, AND DEXAMETHASONE 3.5; 10000; 1 MG/G; [USP'U]/G; MG/G
OINTMENT OPHTHALMIC PRN
Status: DISCONTINUED | OUTPATIENT
Start: 2024-04-25 | End: 2024-04-25 | Stop reason: ALTCHOICE

## 2024-04-25 RX ORDER — ONDANSETRON 2 MG/ML
4 INJECTION INTRAMUSCULAR; INTRAVENOUS
Status: DISCONTINUED | OUTPATIENT
Start: 2024-04-25 | End: 2024-04-25 | Stop reason: HOSPADM

## 2024-04-25 RX ORDER — BETAMETHASONE SODIUM PHOSPHATE AND BETAMETHASONE ACETATE 3; 3 MG/ML; MG/ML
INJECTION, SUSPENSION INTRA-ARTICULAR; INTRALESIONAL; INTRAMUSCULAR; SOFT TISSUE PRN
Status: DISCONTINUED | OUTPATIENT
Start: 2024-04-25 | End: 2024-04-25 | Stop reason: ALTCHOICE

## 2024-04-25 RX ORDER — HALOPERIDOL 5 MG/ML
1 INJECTION INTRAMUSCULAR
Status: DISCONTINUED | OUTPATIENT
Start: 2024-04-25 | End: 2024-04-25 | Stop reason: HOSPADM

## 2024-04-25 RX ORDER — MIDAZOLAM HYDROCHLORIDE 2 MG/2ML
2 INJECTION, SOLUTION INTRAMUSCULAR; INTRAVENOUS
Status: DISCONTINUED | OUTPATIENT
Start: 2024-04-25 | End: 2024-04-25 | Stop reason: HOSPADM

## 2024-04-25 RX ORDER — TETRACAINE HYDROCHLORIDE 5 MG/ML
SOLUTION OPHTHALMIC PRN
Status: DISCONTINUED | OUTPATIENT
Start: 2024-04-25 | End: 2024-04-25 | Stop reason: ALTCHOICE

## 2024-04-25 RX ORDER — ACETAMINOPHEN 500 MG
1000 TABLET ORAL ONCE
Status: DISCONTINUED | OUTPATIENT
Start: 2024-04-25 | End: 2024-04-25 | Stop reason: HOSPADM

## 2024-04-25 RX ORDER — SODIUM CHLORIDE 0.9 % (FLUSH) 0.9 %
5-40 SYRINGE (ML) INJECTION PRN
Status: DISCONTINUED | OUTPATIENT
Start: 2024-04-25 | End: 2024-04-25 | Stop reason: HOSPADM

## 2024-04-25 RX ORDER — SODIUM CHLORIDE, SODIUM LACTATE, POTASSIUM CHLORIDE, CALCIUM CHLORIDE 600; 310; 30; 20 MG/100ML; MG/100ML; MG/100ML; MG/100ML
INJECTION, SOLUTION INTRAVENOUS CONTINUOUS
Status: DISCONTINUED | OUTPATIENT
Start: 2024-04-25 | End: 2024-04-25 | Stop reason: HOSPADM

## 2024-04-25 RX ADMIN — FENTANYL CITRATE 50 MCG: 50 INJECTION, SOLUTION INTRAMUSCULAR; INTRAVENOUS at 12:48

## 2024-04-25 RX ADMIN — MIDAZOLAM 1 MG: 1 INJECTION INTRAMUSCULAR; INTRAVENOUS at 12:44

## 2024-04-25 RX ADMIN — PHENYLEPHRINE HYDROCHLORIDE 1 DROP: 25 SOLUTION/ DROPS OPHTHALMIC at 11:40

## 2024-04-25 RX ADMIN — CYCLOPENTOLATE HYDROCHLORIDE 1 DROP: 20 SOLUTION/ DROPS OPHTHALMIC at 11:35

## 2024-04-25 RX ADMIN — CYCLOPENTOLATE HYDROCHLORIDE 1 DROP: 20 SOLUTION/ DROPS OPHTHALMIC at 11:30

## 2024-04-25 RX ADMIN — PROPOFOL 30 MG: 10 INJECTION, EMULSION INTRAVENOUS at 12:53

## 2024-04-25 RX ADMIN — PHENYLEPHRINE HYDROCHLORIDE 1 DROP: 25 SOLUTION/ DROPS OPHTHALMIC at 11:35

## 2024-04-25 RX ADMIN — PROPOFOL 50 MG: 10 INJECTION, EMULSION INTRAVENOUS at 12:50

## 2024-04-25 RX ADMIN — PHENYLEPHRINE HYDROCHLORIDE 1 DROP: 25 SOLUTION/ DROPS OPHTHALMIC at 11:30

## 2024-04-25 RX ADMIN — CYCLOPENTOLATE HYDROCHLORIDE 1 DROP: 20 SOLUTION/ DROPS OPHTHALMIC at 11:40

## 2024-04-25 RX ADMIN — LIDOCAINE HYDROCHLORIDE 40 MG: 20 INJECTION, SOLUTION EPIDURAL; INFILTRATION; INTRACAUDAL; PERINEURAL at 12:50

## 2024-04-25 RX ADMIN — SODIUM CHLORIDE, SODIUM LACTATE, POTASSIUM CHLORIDE, AND CALCIUM CHLORIDE: 600; 310; 30; 20 INJECTION, SOLUTION INTRAVENOUS at 12:44

## 2024-04-25 ASSESSMENT — PAIN - FUNCTIONAL ASSESSMENT: PAIN_FUNCTIONAL_ASSESSMENT: 0-10

## 2024-04-25 NOTE — BRIEF OP NOTE
Brief Postoperative Note      Patient: Dayna Duran  YOB: 1955  MRN: [id number]    Date of Procedure: 4/25/2024    Pre-Op Diagnosis Codes:     * Right epiretinal membrane [H35.371]    Post-Op Diagnosis: Same       Procedure(s):  VITRECTOMY, 25 GAUGE, SCAR TISSUE REMOVAL OF THE RIGHT EYE    Surgeon(s):  Cedrick Woodson MD    Assistant:  * No surgical staff found *    Anesthesia: Monitor Anesthesia Care    Estimated Blood Loss (mL): Minimal    Complications: None    Specimens:   * No specimens in log *    Implants:  * No implants in log *      Drains: * No LDAs found *    Findings:  Infection Present At Time Of Surgery (PATOS) (choose all levels that have infection present):  No infection present  Other Findings: 0    Electronically signed by CDERICK WOODSON MD on 4/25/2024 at 1:47 PM

## 2024-04-25 NOTE — DISCHARGE INSTRUCTIONS
ACTIVITY  As tolerated and as directed by your doctor.   Bathe or shower as directed by your doctor.     DIET  Clear liquids until no nausea or vomiting; then light diet for the first day.  Advance to regular diet on second day, unless your doctor orders otherwise.   If nausea and vomiting continues, call your doctor.     PAIN  Take pain medication as directed by your doctor.   Call your doctor if pain is NOT relieved by medication.   DO NOT take aspirin of blood thinners unless directed by your doctor.     DRESSING CARE       CALL YOUR DOCTOR IF   Excessive bleeding that does not stop after holding pressure over the area  Temperature of 101 degrees F or above  Excessive redness, swelling or bruising, and/ or green or yellow, smelly discharge from incision    AFTER ANESTHESIA   For the first 24 hours: DO NOT Drive, Drink alcoholic beverages, or Make important decisions.   Be aware of dizziness following anesthesia and while taking pain medication.     APPOINTMENT DATE/ TIME    YOUR DOCTOR'S PHONE NUMBER       DISCHARGE SUMMARY from Nurse    PATIENT INSTRUCTIONS:    After general anesthesia or intravenous sedation, for 24 hours or while taking prescription Narcotics:  Limit your activities  Do not drive and operate hazardous machinery  Do not make important personal or business decisions  Do  not drink alcoholic beverages  If you have not urinated within 8 hours after discharge, please contact your surgeon on call.    *  Please give a list of your current medications to your Primary Care Provider.    *  Please update this list whenever your medications are discontinued, doses are      changed, or new medications (including over-the-counter products) are added.    *  Please carry medication information at all times in case of emergency situations.      These are general instructions for a healthy lifestyle:    No smoking/ No tobacco products/ Avoid exposure to second hand smoke    Surgeon General's Warning:  Quitting  smoking now greatly reduces serious risk to your health.    Obesity, smoking, and sedentary lifestyle greatly increases your risk for illness    A healthy diet, regular physical exercise & weight monitoring are important for maintaining a healthy lifestyle    You may be retaining fluid if you have a history of heart failure or if you experience any of the following symptoms:  Weight gain of 3 pounds or more overnight or 5 pounds in a week, increased swelling in our hands or feet or shortness of breath while lying flat in bed.  Please call your doctor as soon as you notice any of these symptoms; do not wait until your next office visit.    Recognize signs and symptoms of STROKE:    F-face looks uneven    A-arms unable to move or move unevenly    S-speech slurred or non-existent    T-time-call 911 as soon as signs and symptoms begin-DO NOT go       Back to bed or wait to see if you get better-TIME IS BRAIN.

## 2024-04-25 NOTE — ANESTHESIA POSTPROCEDURE EVALUATION
Department of Anesthesiology  Postprocedure Note    Patient: Dayna Duran  MRN: [id number]  YOB: 1955  Date of evaluation: 4/25/2024    Procedure Summary       Date: 04/25/24 Room / Location: [facility] OP OR 07 / [facility]    Anesthesia Start: 1244 Anesthesia Stop: 1344    Procedure: VITRECTOMY, 25 GAUGE, SCAR TISSUE REMOVAL OF THE RIGHT EYE (Right: Eye) Diagnosis:       Right epiretinal membrane      (Right epiretinal membrane [H35.371])    Surgeons: Cedrick Travis MD Responsible Provider: Chris Bunch MD    Anesthesia Type: TIVA ASA Status: 2            Anesthesia Type: No value filed.    Zuleyka Phase I: Zuleyka Score: 10    Zuleyka Phase II: Zuleyka Score: 10    Anesthesia Post Evaluation    Patient location during evaluation: PACU  Patient participation: complete - patient participated  Level of consciousness: awake and alert  Airway patency: patent  Nausea & Vomiting: no nausea and no vomiting  Cardiovascular status: hemodynamically stable  Respiratory status: acceptable, nonlabored ventilation and spontaneous ventilation  Hydration status: euvolemic  Comments: /70   Pulse 73   Temp 97 °F (36.1 °C) (Temporal)   Resp 22   Ht 1.702 m (5' 7\")   Wt 86.2 kg (190 lb)   SpO2 97%   BMI 29.76 kg/m²     Multimodal analgesia pain management approach  Pain management: adequate and satisfactory to patient    No notable events documented.

## 2024-04-25 NOTE — ANESTHESIA PRE PROCEDURE
Department of Anesthesiology  Preprocedure Note       Name:  Dayna Duran   Age:  68 y.o.  :  1955                                          MRN:  [id number]         Date:  2024      Surgeon: Surgeon(s):  Cedrick Travis MD    Procedure: Procedure(s):  VITRECTOMY, 25 GAUGE, LASER, SCAR TISSUE REMOVAL, POSSIBLE GAS FLUID EXCHANGE OF THE RIGHT EYE    Medications prior to admission:   Prior to Admission medications    Medication Sig Start Date End Date Taking? Authorizing Provider   Multiple Vitamins-Minerals (AIRBORNE PO) Take by mouth daily   Yes Esteban Wilkes MD   VITAMIN D PO Take by mouth Daily   Yes Esteban Wilkes MD   CALCIUM PO Take by mouth daily   Yes Esteban Wilkes MD   Ascorbic Acid (VITAMIN C PO) Take by mouth daily   Yes Esteban Wilkes MD   benazepril-hydrochlorthiazide (LOTENSIN HCT) 20-12.5 MG per tablet Take 1 tablet by mouth daily 23   Dov Moctezuma MD   levothyroxine (SYNTHROID) 75 MCG tablet Take 1 tablet by mouth every morning (before breakfast) 23   Dov Moctezuma MD   liothyronine (CYTOMEL) 5 MCG tablet Take 1 tablet by mouth daily 23   Dov Moctezuma MD   LORazepam (ATIVAN) 0.5 MG tablet Take 1 tablet by mouth every 6 hours as needed for Anxiety for up to 90 days. Max Daily Amount: 2 mg 23  Dov Moctezuma MD   omeprazole (PRILOSEC) 20 MG delayed release capsule Take by mouth as needed    Esteban Wilkes MD   diphenhydrAMINE (BENADRYL) 25 MG capsule Take 2 capsules by mouth nightly as needed    Automatic Reconciliation, Ar       Current medications:    Current Facility-Administered Medications   Medication Dose Route Frequency Provider Last Rate Last Admin   • lidocaine 1 % injection 1 mL  1 mL IntraDERmal Once PRN Chris Bunch MD       • acetaminophen (TYLENOL) tablet 1,000 mg  1,000 mg Oral Once Chris Bunch MD       • famotidine (PEPCID) 20 mg in sodium chloride (PF)

## 2024-04-26 NOTE — OP NOTE
[place] [id number]                            OPERATIVE REPORT      PATIENT NAME: TARAS BASS             : 1955  MED REC NO: [id number]                       ROOM: OPOR  ACCOUNT NO: [id number]                       ADMIT DATE: 2024  PROVIDER: Cedrick Travis MD    DATE OF SERVICE:  2024    PREOPERATIVE DIAGNOSES:       1. Epiretinal perforation, visually significant, right eye.     2. Nuclear sclerosis, minimal, right eye.     3. Posterior vitreous detachment.    POSTOPERATIVE DIAGNOSES:       1. Epiretinal perforation, visually significant, right eye.     2. Nuclear sclerosis, minimal, right eye.     3. Posterior vitreous detachment.    PROCEDURES PERFORMED:  Pars plana vitrectomy with removal of epiretinal proliferation, right eye.    SURGEON:  Cedrick Travis MD    ASSISTANT:  None.    ANESTHESIA:  Modified.    ESTIMATED BLOOD LOSS:  None.    SPECIMENS REMOVED:  None.    INTRAOPERATIVE FINDINGS:  0     COMPLICATIONS:  None.    IMPLANTS:  None.    INDICATIONS:  The patient has significant vision loss associated with central proliferation with significant macular distortion.  After discussing options, risks, and benefits, the patient elected to proceed with surgical removal.    DESCRIPTION OF PROCEDURE:  After adequate preop evaluation and informed consent had been obtained, the patient was brought into the operating suite.  IV access and sedation were obtained without complications.  Modified Van Lint 0 injection was given.  Following preop time-out, good anesthesia and akinesia noted.  The eye was prepped and draped in the usual fashion for vitreoretinal surgery.  A lid speculum was placed for the eye.  Three separate 25-gauge cannulas were placed.  Infusion was verified, turned to a pressure of 35 mmHg.  Through these, the central vitreous was removed.  The hyaloid was already elevated.  The macula was  inspected, which showed dense central proliferation with clearly visible edges.  This was elevated with MVR blade and then stripped over the central macula, and one large sheet was cleared nicely.  TissueBlue dye was then instilled centrally, and it stained the central macula very well.  The ILM was elevated as this was fairly adherent, and there was some surface cellular proliferation temporally which was removed with the ILM temporally and centrally.  In the temporal macula and just inferior to fixation, the ILM became very adherent, and there was significant retinal distortion as I started to peel this since I felt that the clearing of the fovea was wise and that any additional peeling would be likely more traumatic than was warranted.  The periphery was inspected.  No breaks or tears were seen with careful scleral depressed examination.  The first 25-gauge cannula was removed supratemporally, and there was some leaking from this with a pressure of 20.  Therefore, I did open the conjunctiva and closed with 6-0 plain, and all other wounds were closed with 6-0 plain as well leaving the *** pressure of 20.  Subconjunctival Celestone and Ancef were given intranasally.  The eye was patched with Maxitrol ointment.  It should be noted that suppressed examination did not show any breaks or tears.  No laser was used as I did not feel that any gas injection would be warranted.    DRAINS:  None.        MD SANTY VELA/EVERT  D:  04/25/2024 13:50:21  T:  04/25/2024 16:57:32  JOB #:  [id number]

## 2024-05-09 ENCOUNTER — HOSPITAL ENCOUNTER (OUTPATIENT)
Dept: LAB | Age: 69
Discharge: HOME | End: 2024-05-12

## 2024-05-09 DIAGNOSIS — Z72.89 OTHER PROBLEMS RELATED TO LIFESTYLE: ICD-10-CM

## 2024-05-09 DIAGNOSIS — E07.9 THYROID DISEASE: ICD-10-CM

## 2024-05-09 DIAGNOSIS — Z11.59 NEED FOR HEPATITIS C SCREENING TEST: ICD-10-CM

## 2024-05-09 DIAGNOSIS — F41.1 ANXIETY STATE: ICD-10-CM

## 2024-05-09 DIAGNOSIS — I10 ESSENTIAL HYPERTENSION, BENIGN: ICD-10-CM

## 2024-05-09 LAB
ALBUMIN SERPL-MCNC: 3.8 G/DL (ref 3.2–4.6)
ALBUMIN/GLOB SERPL: 1.2 (ref 1–1.9)
ALP SERPL-CCNC: 96 U/L (ref 35–104)
ALT SERPL-CCNC: 28 U/L (ref 12–65)
ANION GAP SERPL CALC-SCNC: 11 MMOL/L (ref 9–18)
AST SERPL-CCNC: 26 U/L (ref 15–37)
BILIRUB SERPL-MCNC: 0.5 MG/DL (ref 0–1.2)
BUN SERPL-MCNC: 25 MG/DL (ref 8–23)
CALCIUM SERPL-MCNC: 9.7 MG/DL (ref 8.8–10.2)
CHLORIDE SERPL-SCNC: 101 MMOL/L (ref 98–107)
CO2 SERPL-SCNC: 22 MMOL/L (ref 20–28)
CREAT SERPL-MCNC: 0.86 MG/DL (ref 0.6–1.1)
ERYTHROCYTE [DISTWIDTH] IN BLOOD BY AUTOMATED COUNT: 13.9 % (ref 11.9–14.6)
GLOBULIN SER CALC-MCNC: 3.2 G/DL (ref 2.3–3.5)
GLUCOSE SERPL-MCNC: 117 MG/DL (ref 70–99)
HCT VFR BLD AUTO: 44 % (ref 35.8–46.3)
HCV AB SER QL: NONREACTIVE
HGB BLD-MCNC: 14.1 G/DL (ref 11.7–15.4)
MCH RBC QN AUTO: 28.8 PG (ref 26.1–32.9)
MCHC RBC AUTO-ENTMCNC: 32 G/DL (ref 31.4–35)
MCV RBC AUTO: 89.8 FL (ref 82–102)
NRBC # BLD: 0 K/UL (ref 0–0.2)
PLATELET # BLD AUTO: 289 K/UL (ref 150–450)
PMV BLD AUTO: 10.3 FL (ref 9.4–12.3)
POTASSIUM SERPL-SCNC: 4.5 MMOL/L (ref 3.5–5.1)
PROT SERPL-MCNC: 6.9 G/DL (ref 6.3–8.2)
RBC # BLD AUTO: 4.9 M/UL (ref 4.05–5.2)
SODIUM SERPL-SCNC: 134 MMOL/L (ref 136–145)
TSH, 3RD GENERATION: 1.7 UIU/ML (ref 0.27–4.2)
WBC # BLD AUTO: 6.6 K/UL (ref 4.3–11.1)

## 2024-05-13 SDOH — ECONOMIC STABILITY: INCOME INSECURITY: HOW HARD IS IT FOR YOU TO PAY FOR THE VERY BASICS LIKE FOOD, HOUSING, MEDICAL CARE, AND HEATING?: NOT HARD AT ALL

## 2024-05-13 SDOH — ECONOMIC STABILITY: FOOD INSECURITY: WITHIN THE PAST 12 MONTHS, THE FOOD YOU BOUGHT JUST DIDN'T LAST AND YOU DIDN'T HAVE MONEY TO GET MORE.: NEVER TRUE

## 2024-05-13 SDOH — ECONOMIC STABILITY: FOOD INSECURITY: WITHIN THE PAST 12 MONTHS, YOU WORRIED THAT YOUR FOOD WOULD RUN OUT BEFORE YOU GOT MONEY TO BUY MORE.: NEVER TRUE

## 2024-05-13 ASSESSMENT — PATIENT HEALTH QUESTIONNAIRE - PHQ9
1. LITTLE INTEREST OR PLEASURE IN DOING THINGS: NOT AT ALL
SUM OF ALL RESPONSES TO PHQ QUESTIONS 1-9: 0
2. FEELING DOWN, DEPRESSED OR HOPELESS: NOT AT ALL
SUM OF ALL RESPONSES TO PHQ QUESTIONS 1-9: 0
SUM OF ALL RESPONSES TO PHQ9 QUESTIONS 1 & 2: 0
SUM OF ALL RESPONSES TO PHQ QUESTIONS 1-9: 0
SUM OF ALL RESPONSES TO PHQ9 QUESTIONS 1 & 2: 0
2. FEELING DOWN, DEPRESSED OR HOPELESS: NOT AT ALL
1. LITTLE INTEREST OR PLEASURE IN DOING THINGS: NOT AT ALL
SUM OF ALL RESPONSES TO PHQ QUESTIONS 1-9: 0

## 2024-05-16 ENCOUNTER — OFFICE VISIT (OUTPATIENT)
Dept: INTERNAL MEDICINE CLINIC | Facility: CLINIC | Age: 69
End: 2024-05-16
Payer: MEDICARE

## 2024-05-16 VITALS
HEART RATE: 84 BPM | OXYGEN SATURATION: 95 % | TEMPERATURE: 97.8 F | HEIGHT: 67 IN | SYSTOLIC BLOOD PRESSURE: 128 MMHG | WEIGHT: 197 LBS | DIASTOLIC BLOOD PRESSURE: 74 MMHG | BODY MASS INDEX: 30.92 KG/M2

## 2024-05-16 DIAGNOSIS — F41.1 ANXIETY STATE: ICD-10-CM

## 2024-05-16 DIAGNOSIS — Z85.3 HISTORY OF CANCER OF LEFT BREAST: ICD-10-CM

## 2024-05-16 DIAGNOSIS — Z82.49 FAM HX-ISCHEM HEART DISEASE: ICD-10-CM

## 2024-05-16 DIAGNOSIS — I10 ESSENTIAL HYPERTENSION, BENIGN: ICD-10-CM

## 2024-05-16 DIAGNOSIS — Z00.00 MEDICARE ANNUAL WELLNESS VISIT, SUBSEQUENT: Primary | ICD-10-CM

## 2024-05-16 DIAGNOSIS — E07.9 THYROID DISEASE: ICD-10-CM

## 2024-05-16 DIAGNOSIS — Z63.6 CAREGIVER STRESS: ICD-10-CM

## 2024-05-16 DIAGNOSIS — Z92.3 PERSONAL HISTORY OF RADIATION THERAPY: ICD-10-CM

## 2024-05-16 PROCEDURE — G0439 PPPS, SUBSEQ VISIT: HCPCS | Performed by: INTERNAL MEDICINE

## 2024-05-16 PROCEDURE — 3074F SYST BP LT 130 MM HG: CPT | Performed by: INTERNAL MEDICINE

## 2024-05-16 PROCEDURE — 99213 OFFICE O/P EST LOW 20 MIN: CPT | Performed by: INTERNAL MEDICINE

## 2024-05-16 PROCEDURE — 1123F ACP DISCUSS/DSCN MKR DOCD: CPT | Performed by: INTERNAL MEDICINE

## 2024-05-16 PROCEDURE — 3078F DIAST BP <80 MM HG: CPT | Performed by: INTERNAL MEDICINE

## 2024-05-16 PROCEDURE — 93000 ELECTROCARDIOGRAM COMPLETE: CPT | Performed by: INTERNAL MEDICINE

## 2024-05-16 RX ORDER — LIOTHYRONINE SODIUM 5 UG/1
5 TABLET ORAL DAILY
Qty: 90 TABLET | Refills: 3 | Status: SHIPPED | OUTPATIENT
Start: 2024-05-16

## 2024-05-16 RX ORDER — PREDNISOLONE ACETATE 10 MG/ML
SUSPENSION/ DROPS OPHTHALMIC
COMMUNITY
Start: 2024-04-11

## 2024-05-16 RX ORDER — BENAZEPRIL HYDROCHLORIDE AND HYDROCHLOROTHIAZIDE 20; 12.5 MG/1; MG/1
1 TABLET ORAL DAILY
Qty: 90 TABLET | Refills: 3 | Status: SHIPPED | OUTPATIENT
Start: 2024-05-16

## 2024-05-16 RX ORDER — LORAZEPAM 0.5 MG/1
0.5 TABLET ORAL EVERY 6 HOURS PRN
Qty: 90 TABLET | Refills: 1 | Status: SHIPPED | OUTPATIENT
Start: 2024-05-16 | End: 2024-08-14

## 2024-05-16 RX ORDER — OFLOXACIN 3 MG/ML
SOLUTION/ DROPS OPHTHALMIC
COMMUNITY
Start: 2024-04-11

## 2024-05-16 RX ORDER — LEVOTHYROXINE SODIUM 0.07 MG/1
75 TABLET ORAL
Qty: 90 TABLET | Refills: 3 | Status: SHIPPED | OUTPATIENT
Start: 2024-05-16

## 2024-05-16 SDOH — SOCIAL STABILITY - SOCIAL INSECURITY: DEPENDENT RELATIVE NEEDING CARE AT HOME: Z63.6

## 2024-05-16 NOTE — ASSESSMENT & PLAN NOTE
Treatment regimen is effective.     Medication refilled    Controlled Substance Monitoring:    Acute and Chronic Pain Monitoring:      [facility] RX Monitoring Periodic Controlled Substance Monitoring   5/16/2024   9:10 AM  No signs of potential drug abuse or diversion identified.

## 2024-05-16 NOTE — ASSESSMENT & PLAN NOTE
Treatment regimen is effective.       Lab Results   Component Value Date    TSH 1.780 04/21/2022    NSF5AWN 1.700 05/09/2024     Continue Synthroid 75 mcg daily.    Medication refilled    Follow up lab in six months.

## 2024-05-16 NOTE — PROGRESS NOTES
Orders:  -     LORazepam (ATIVAN) 0.5 MG tablet; Take 1 tablet by mouth every 6 hours as needed for Anxiety for up to 90 days. Max Daily Amount: 2 mg, Disp-90 tablet, R-1Normal  Caregiver stress  -     LORazepam (ATIVAN) 0.5 MG tablet; Take 1 tablet by mouth every 6 hours as needed for Anxiety for up to 90 days. Max Daily Amount: 2 mg, Disp-90 tablet, R-1Normal  History of cancer of left breast  Assessment & Plan:   Monitored by specialist- no acute findings meriting change in the plan  Fam hx-ischem heart disease  -     EKG 12 Lead  -     CT CARDIAC CALCIUM SCORING; Future  -     Vascular duplex carotid bilateral; Future  Personal history of radiation therapy  -     EKG 12 Lead  -     CT CARDIAC CALCIUM SCORING; Future  -     Vascular duplex carotid bilateral; Future    Return in 6 months (on 11/16/2024).    On this date, 5/16/24, outside of the [facility], I have spent 23 minutes reviewing previous notes, test results and face to face with the patient discussing the diagnosis and importance of compliance with the treatment plan as well as documenting on the day of the visit.     An electronic signature was used to authenticate this note.  -- Dov Moctezuma MD       Recommendations for Preventive Services Due: see orders and patient instructions/AVS.  Recommended screening schedule for the next 5-10 years is provided to the patient in written form: see Patient Instructions/AVS.     Return in 6 months (on 11/16/2024).     Subjective   The following acute and/or chronic problems were also addressed today:  Dayna MITCHELL Roger 68 y.o. female     Her  has been undergoing cancer treatments and he had a good response to the treatment.      Her son is living in [place], survived a tornado.  Very scary.  She just got home.    4/25/2024 have vitrectomy and scar tissue removal from the right.  She anticipates cataract surgery later this year.    She had a melanoma removed posterior left shoulder with wide local

## 2024-06-03 ENCOUNTER — HOSPITAL ENCOUNTER (OUTPATIENT)
Dept: ULTRASOUND IMAGING | Age: 69
Discharge: HOME | End: 2024-06-06
Attending: INTERNAL MEDICINE
Payer: MEDICARE

## 2024-06-03 ENCOUNTER — HOSPITAL ENCOUNTER (OUTPATIENT)
Dept: CT IMAGING | Age: 69
Discharge: HOME | End: 2024-06-06
Attending: INTERNAL MEDICINE

## 2024-06-03 DIAGNOSIS — I10 ESSENTIAL HYPERTENSION, BENIGN: ICD-10-CM

## 2024-06-03 DIAGNOSIS — Z92.3 PERSONAL HISTORY OF RADIATION THERAPY: ICD-10-CM

## 2024-06-03 DIAGNOSIS — Z82.49 FAM HX-ISCHEM HEART DISEASE: ICD-10-CM

## 2024-06-03 PROCEDURE — 75571 CT HRT W/O DYE W/CA TEST: CPT

## 2024-06-03 PROCEDURE — 93880 EXTRACRANIAL BILAT STUDY: CPT

## 2024-09-13 ENCOUNTER — OFFICE VISIT (OUTPATIENT)
Dept: INTERNAL MEDICINE CLINIC | Facility: CLINIC | Age: 69
End: 2024-09-13

## 2024-09-13 VITALS
HEIGHT: 67 IN | TEMPERATURE: 97.2 F | BODY MASS INDEX: 30.61 KG/M2 | DIASTOLIC BLOOD PRESSURE: 76 MMHG | WEIGHT: 195 LBS | SYSTOLIC BLOOD PRESSURE: 124 MMHG | OXYGEN SATURATION: 98 % | HEART RATE: 71 BPM

## 2024-09-13 DIAGNOSIS — J01.10 ACUTE NON-RECURRENT FRONTAL SINUSITIS: Primary | ICD-10-CM

## 2024-09-13 DIAGNOSIS — R09.81 NASAL CONGESTION: ICD-10-CM

## 2024-09-13 DIAGNOSIS — J02.9 SORE THROAT: ICD-10-CM

## 2024-09-13 LAB
EXP DATE SOLUTION: NORMAL
EXP DATE SWAB: NORMAL
EXPIRATION DATE: NORMAL
GROUP A STREP ANTIGEN, POC: NEGATIVE
LOT NUMBER POC: NORMAL
LOT NUMBER SOLUTION: NORMAL
LOT NUMBER SWAB: NORMAL
SARS-COV-2 RNA, POC: NEGATIVE
VALID INTERNAL CONTROL, POC: YES

## 2024-09-13 RX ORDER — DOXYCYCLINE HYCLATE 100 MG
100 TABLET ORAL 2 TIMES DAILY
Qty: 20 TABLET | Refills: 0 | Status: SHIPPED | OUTPATIENT
Start: 2024-09-13 | End: 2024-09-23

## 2024-09-13 ASSESSMENT — ENCOUNTER SYMPTOMS
DIARRHEA: 0
SHORTNESS OF BREATH: 0
VOMITING: 0
TROUBLE SWALLOWING: 1
SORE THROAT: 1
ABDOMINAL PAIN: 0
WHEEZING: 0
SINUS PRESSURE: 0
NAUSEA: 0
COUGH: 1
RHINORRHEA: 0

## 2024-10-23 ENCOUNTER — TRANSCRIBE ORDERS (OUTPATIENT)
Dept: SCHEDULING | Age: 69
End: 2024-10-23

## 2024-10-23 DIAGNOSIS — Z12.31 ENCOUNTER FOR SCREENING MAMMOGRAM FOR MALIGNANT NEOPLASM OF BREAST: Primary | ICD-10-CM

## 2024-11-08 DIAGNOSIS — I10 ESSENTIAL HYPERTENSION, BENIGN: Primary | ICD-10-CM

## 2024-11-08 DIAGNOSIS — R73.01 ELEVATED FASTING GLUCOSE: ICD-10-CM

## 2024-11-14 ENCOUNTER — LAB (OUTPATIENT)
Dept: INTERNAL MEDICINE CLINIC | Facility: CLINIC | Age: 69
End: 2024-11-14

## 2024-11-14 DIAGNOSIS — R73.01 ELEVATED FASTING GLUCOSE: ICD-10-CM

## 2024-11-14 DIAGNOSIS — I10 ESSENTIAL HYPERTENSION, BENIGN: ICD-10-CM

## 2024-11-14 LAB
ALBUMIN SERPL-MCNC: 3.7 G/DL (ref 3.2–4.6)
ALBUMIN/GLOB SERPL: 1 (ref 1–1.9)
ALP SERPL-CCNC: 108 U/L (ref 35–104)
ALT SERPL-CCNC: 21 U/L (ref 8–45)
ANION GAP SERPL CALC-SCNC: 13 MMOL/L (ref 7–16)
AST SERPL-CCNC: 29 U/L (ref 15–37)
BASOPHILS # BLD: 0.1 K/UL (ref 0–0.2)
BASOPHILS NFR BLD: 1 % (ref 0–2)
BILIRUB SERPL-MCNC: 0.6 MG/DL (ref 0–1.2)
BUN SERPL-MCNC: 11 MG/DL (ref 8–23)
CALCIUM SERPL-MCNC: 9.6 MG/DL (ref 8.8–10.2)
CHLORIDE SERPL-SCNC: 100 MMOL/L (ref 98–107)
CHOLEST SERPL-MCNC: 207 MG/DL (ref 0–200)
CO2 SERPL-SCNC: 23 MMOL/L (ref 20–29)
CREAT SERPL-MCNC: 0.81 MG/DL (ref 0.6–1.1)
DIFFERENTIAL METHOD BLD: ABNORMAL
EOSINOPHIL # BLD: 0.2 K/UL (ref 0–0.8)
EOSINOPHIL NFR BLD: 2 % (ref 0.5–7.8)
ERYTHROCYTE [DISTWIDTH] IN BLOOD BY AUTOMATED COUNT: 14.2 % (ref 11.9–14.6)
EST. AVERAGE GLUCOSE BLD GHB EST-MCNC: 145 MG/DL
GLOBULIN SER CALC-MCNC: 3.8 G/DL (ref 2.3–3.5)
GLUCOSE SERPL-MCNC: 114 MG/DL (ref 70–99)
HBA1C MFR BLD: 6.7 % (ref 0–5.6)
HCT VFR BLD AUTO: 46.8 % (ref 35.8–46.3)
HDLC SERPL-MCNC: 53 MG/DL (ref 40–60)
HDLC SERPL: 3.9 (ref 0–5)
HGB BLD-MCNC: 14.8 G/DL (ref 11.7–15.4)
IMM GRANULOCYTES # BLD AUTO: 0 K/UL (ref 0–0.5)
IMM GRANULOCYTES NFR BLD AUTO: 0 % (ref 0–5)
LDLC SERPL CALC-MCNC: 132 MG/DL (ref 0–100)
LYMPHOCYTES # BLD: 2.7 K/UL (ref 0.5–4.6)
LYMPHOCYTES NFR BLD: 37 % (ref 13–44)
MCH RBC QN AUTO: 28.5 PG (ref 26.1–32.9)
MCHC RBC AUTO-ENTMCNC: 31.6 G/DL (ref 31.4–35)
MCV RBC AUTO: 90.2 FL (ref 82–102)
MONOCYTES # BLD: 0.6 K/UL (ref 0.1–1.3)
MONOCYTES NFR BLD: 8 % (ref 4–12)
NEUTS SEG # BLD: 3.8 K/UL (ref 1.7–8.2)
NEUTS SEG NFR BLD: 52 % (ref 43–78)
NRBC # BLD: 0 K/UL (ref 0–0.2)
PLATELET # BLD AUTO: 330 K/UL (ref 150–450)
PMV BLD AUTO: 10.4 FL (ref 9.4–12.3)
POTASSIUM SERPL-SCNC: 4.6 MMOL/L (ref 3.5–5.1)
PROT SERPL-MCNC: 7.5 G/DL (ref 6.3–8.2)
RBC # BLD AUTO: 5.19 M/UL (ref 4.05–5.2)
SODIUM SERPL-SCNC: 136 MMOL/L (ref 136–145)
TRIGL SERPL-MCNC: 113 MG/DL (ref 0–150)
VLDLC SERPL CALC-MCNC: 23 MG/DL (ref 6–23)
WBC # BLD AUTO: 7.3 K/UL (ref 4.3–11.1)

## 2024-11-19 ENCOUNTER — OFFICE VISIT (OUTPATIENT)
Dept: INTERNAL MEDICINE CLINIC | Facility: CLINIC | Age: 69
End: 2024-11-19

## 2024-11-19 VITALS
OXYGEN SATURATION: 96 % | TEMPERATURE: 97 F | SYSTOLIC BLOOD PRESSURE: 126 MMHG | WEIGHT: 196 LBS | HEIGHT: 67 IN | BODY MASS INDEX: 30.76 KG/M2 | DIASTOLIC BLOOD PRESSURE: 80 MMHG | HEART RATE: 76 BPM

## 2024-11-19 DIAGNOSIS — I10 ESSENTIAL HYPERTENSION, BENIGN: ICD-10-CM

## 2024-11-19 DIAGNOSIS — E07.9 THYROID DISEASE: Primary | ICD-10-CM

## 2024-11-19 DIAGNOSIS — R73.03 PREDIABETES: ICD-10-CM

## 2024-11-19 DIAGNOSIS — F41.1 ANXIETY STATE: ICD-10-CM

## 2024-11-19 DIAGNOSIS — Z85.3 HISTORY OF CANCER OF LEFT BREAST: ICD-10-CM

## 2024-11-19 DIAGNOSIS — Z78.0 POST-MENOPAUSE: ICD-10-CM

## 2024-11-19 DIAGNOSIS — E55.9 VITAMIN D DEFICIENCY, UNSPECIFIED: ICD-10-CM

## 2024-11-19 DIAGNOSIS — M85.80 OSTEOPENIA DUE TO CANCER THERAPY: ICD-10-CM

## 2024-11-19 RX ORDER — CHLORAL HYDRATE 500 MG
CAPSULE ORAL
COMMUNITY
Start: 2024-11-15

## 2024-11-19 RX ORDER — LORAZEPAM 0.5 MG/1
0.5 TABLET ORAL EVERY 6 HOURS PRN
Qty: 90 TABLET | Refills: 1 | Status: SHIPPED | OUTPATIENT
Start: 2024-11-19 | End: 2025-02-17

## 2024-11-19 RX ORDER — BENAZEPRIL HYDROCHLORIDE AND HYDROCHLOROTHIAZIDE 20; 12.5 MG/1; MG/1
1 TABLET ORAL DAILY
Qty: 90 TABLET | Refills: 3 | Status: SHIPPED | OUTPATIENT
Start: 2024-11-19

## 2024-11-19 RX ORDER — NICOTINE 14MG/24HR
PATCH, TRANSDERMAL 24 HOURS TRANSDERMAL
COMMUNITY
Start: 2024-11-11

## 2024-11-19 RX ORDER — LIOTHYRONINE SODIUM 5 UG/1
5 TABLET ORAL DAILY
Qty: 90 TABLET | Refills: 3 | Status: SHIPPED | OUTPATIENT
Start: 2024-11-19

## 2024-11-19 RX ORDER — LEVOTHYROXINE SODIUM 75 UG/1
75 TABLET ORAL
Qty: 90 TABLET | Refills: 3 | Status: SHIPPED | OUTPATIENT
Start: 2024-11-19

## 2024-11-19 ASSESSMENT — PATIENT HEALTH QUESTIONNAIRE - PHQ9
SUM OF ALL RESPONSES TO PHQ QUESTIONS 1-9: 0
1. LITTLE INTEREST OR PLEASURE IN DOING THINGS: NOT AT ALL
SUM OF ALL RESPONSES TO PHQ QUESTIONS 1-9: 0
SUM OF ALL RESPONSES TO PHQ9 QUESTIONS 1 & 2: 0
2. FEELING DOWN, DEPRESSED OR HOPELESS: NOT AT ALL
SUM OF ALL RESPONSES TO PHQ QUESTIONS 1-9: 0
SUM OF ALL RESPONSES TO PHQ QUESTIONS 1-9: 0

## 2024-11-19 NOTE — PROGRESS NOTES
ASSESSMENT/PLAN:  Assessment & Plan  1. Prediabetes.  Her blood sugar levels are slightly elevated but not terrible. She is advised to continue working on her diet to keep her blood sugar levels in line.    2. TSH Monitoring.  The TSH test was not conducted during this visit. It has been stable and she has been consistently taking her T3 medication. A TSH test will be ordered.    3. RSV Vaccination.  She inquired about the RSV vaccine. Given her 's risk, she is advised to proceed with getting the RSV vaccine. She is reminded to space out vaccines by at least a week or two if receiving multiple vaccines.    4. Bone Density.  The last bone density scan was conducted in 2019, revealing a T score of -0.5 on the spine, indicative of mild, age-appropriate osteopenia. She has been lifting weights and working out consistently, which has helped maintain her bone density. A bone density scan will be scheduled to monitor her condition.    5. Vitamin D.  She mentioned that her vitamin D levels were low in the winter but has never had a deficiency. A vitamin D test will be ordered.    6. Health Maintenance.  She has a mammogram scheduled for 11/24/2024.    Follow-up  Return in 6 months for follow-up.    1. Thyroid disease  Assessment & Plan:     Treatment regimen is effective.       Lab Results   Component Value Date    TSH 1.700 05/09/2024     Continue Synthroid 75 mcg daily.    Medication refilled    Follow up lab in six months.    Orders:  -     levothyroxine (SYNTHROID) 75 MCG tablet; Take 1 tablet by mouth every morning (before breakfast), Disp-90 tablet, R-3Normal  -     liothyronine (CYTOMEL) 5 MCG tablet; Take 1 tablet by mouth daily, Disp-90 tablet, R-3Normal  -     DEXA BONE DENSITY AXIAL SKELETON; Future  -     Lipid Panel; Future  -     CBC; Future  -     TSH; Future  -     Comprehensive Metabolic Panel; Future  -     Vitamin D 25 Hydroxy; Future  -     Hemoglobin A1C; Future  2. Essential hypertension,

## 2024-11-19 NOTE — ASSESSMENT & PLAN NOTE
Treatment regimen is effective.     Medication refilled    Controlled Substance Monitoring:    Acute and Chronic Pain Monitoring:   RX Monitoring Periodic Controlled Substance Monitoring   5/16/2024   9:10 AM No signs of potential drug abuse or diversion identified.

## 2024-11-19 NOTE — ASSESSMENT & PLAN NOTE
EKG:   Sinus Rhythm   rate 67  -RSR(V1) -nondiagnostic.  PROBABLY NORMAL     Treatment regimen is effective.       Lab Results   Component Value Date/Time     11/14/2024 08:15 AM    K 4.6 11/14/2024 08:15 AM     11/14/2024 08:15 AM    CO2 23 11/14/2024 08:15 AM    BUN 11 11/14/2024 08:15 AM    CREATININE 0.81 11/14/2024 08:15 AM    GLUCOSE 114 11/14/2024 08:15 AM    CALCIUM 9.6 11/14/2024 08:15 AM    LABGLOM 79 11/14/2024 08:15 AM    LABGLOM 55 11/09/2023 08:19 AM    LABGLOM 77 04/21/2022 11:22 AM      Hypertension Medications       Antihypertensive Combinations       benazepril-hydrochlorthiazide (LOTENSIN HCT) 20-12.5 MG per tablet Take 1 tablet by mouth daily

## 2024-11-19 NOTE — ASSESSMENT & PLAN NOTE
Treatment regimen is effective.       Lab Results   Component Value Date    TSH 1.700 05/09/2024     Continue Synthroid 75 mcg daily.    Medication refilled    Follow up lab in six months.

## 2024-11-19 NOTE — ASSESSMENT & PLAN NOTE
Lab Results   Component Value Date/Time     11/14/2024 08:15 AM    K 4.6 11/14/2024 08:15 AM     11/14/2024 08:15 AM    CO2 23 11/14/2024 08:15 AM    BUN 11 11/14/2024 08:15 AM    CREATININE 0.81 11/14/2024 08:15 AM    GLUCOSE 114 11/14/2024 08:15 AM    CALCIUM 9.6 11/14/2024 08:15 AM    LABGLOM 79 11/14/2024 08:15 AM    LABGLOM 55 11/09/2023 08:19 AM    LABGLOM 77 04/21/2022 11:22 AM      Hemoglobin A1C   Date Value Ref Range Status   11/14/2024 6.7 (H) 0 - 5.6 % Final     Comment:     Reference Range  Normal       <5.7%  Prediabetes  5.7-6.4%  Diabetes     >6.4%       Hemoglobin A1C, POC   Date Value Ref Range Status   10/28/2021 6.3 % Final

## 2024-11-25 ENCOUNTER — HOSPITAL ENCOUNTER (OUTPATIENT)
Dept: MAMMOGRAPHY | Age: 69
Discharge: HOME | End: 2024-11-28
Attending: INTERNAL MEDICINE
Payer: MEDICARE

## 2024-11-25 DIAGNOSIS — M85.80 OSTEOPENIA DUE TO CANCER THERAPY: ICD-10-CM

## 2024-11-25 DIAGNOSIS — E07.9 THYROID DISEASE: ICD-10-CM

## 2024-11-25 DIAGNOSIS — Z85.3 HISTORY OF CANCER OF LEFT BREAST: ICD-10-CM

## 2024-11-25 DIAGNOSIS — Z78.0 POST-MENOPAUSE: ICD-10-CM

## 2024-11-25 PROCEDURE — 77080 DXA BONE DENSITY AXIAL: CPT

## 2025-01-12 DIAGNOSIS — I10 ESSENTIAL HYPERTENSION, BENIGN: ICD-10-CM

## 2025-01-12 RX ORDER — BENAZEPRIL HYDROCHLORIDE AND HYDROCHLOROTHIAZIDE 20; 12.5 MG/1; MG/1
1 TABLET ORAL DAILY
Qty: 90 TABLET | Refills: 3 | OUTPATIENT
Start: 2025-01-12

## 2025-01-17 DIAGNOSIS — E07.9 THYROID DISEASE: ICD-10-CM

## 2025-01-17 RX ORDER — LEVOTHYROXINE SODIUM 75 UG/1
75 TABLET ORAL
Qty: 90 TABLET | Refills: 3 | OUTPATIENT
Start: 2025-01-17

## 2025-03-28 NOTE — TELEPHONE ENCOUNTER
Order placed, can we schedule him for the first injection.  Thanks.   Patient notified [id number] Bernice Wei to resume. Monitor BP and let me know if any concerns. Patient told new prescription was sent to her pharmacy.  Patient voiced understanding

## 2025-05-15 ENCOUNTER — LAB (OUTPATIENT)
Dept: INTERNAL MEDICINE CLINIC | Facility: CLINIC | Age: 70
End: 2025-05-15

## 2025-05-15 DIAGNOSIS — F41.1 ANXIETY STATE: ICD-10-CM

## 2025-05-15 DIAGNOSIS — R73.03 PREDIABETES: ICD-10-CM

## 2025-05-15 DIAGNOSIS — Z78.0 POST-MENOPAUSE: ICD-10-CM

## 2025-05-15 DIAGNOSIS — E55.9 VITAMIN D DEFICIENCY, UNSPECIFIED: ICD-10-CM

## 2025-05-15 DIAGNOSIS — I10 ESSENTIAL HYPERTENSION, BENIGN: ICD-10-CM

## 2025-05-15 DIAGNOSIS — M85.80 OSTEOPENIA DUE TO CANCER THERAPY: ICD-10-CM

## 2025-05-15 DIAGNOSIS — E07.9 THYROID DISEASE: ICD-10-CM

## 2025-05-15 DIAGNOSIS — Z85.3 HISTORY OF CANCER OF LEFT BREAST: ICD-10-CM

## 2025-05-15 LAB
25(OH)D3 SERPL-MCNC: 40.9 NG/ML (ref 30–100)
ALBUMIN SERPL-MCNC: 3.7 G/DL (ref 3.2–4.6)
ALBUMIN/GLOB SERPL: 1.2 (ref 1–1.9)
ALP SERPL-CCNC: 97 U/L (ref 35–104)
ALT SERPL-CCNC: 25 U/L (ref 8–45)
ANION GAP SERPL CALC-SCNC: 12 MMOL/L (ref 7–16)
AST SERPL-CCNC: 26 U/L (ref 15–37)
BILIRUB SERPL-MCNC: 0.9 MG/DL (ref 0–1.2)
BUN SERPL-MCNC: 17 MG/DL (ref 8–23)
CALCIUM SERPL-MCNC: 9.6 MG/DL (ref 8.8–10.2)
CHLORIDE SERPL-SCNC: 100 MMOL/L (ref 98–107)
CHOLEST SERPL-MCNC: 221 MG/DL (ref 0–200)
CO2 SERPL-SCNC: 25 MMOL/L (ref 20–29)
CREAT SERPL-MCNC: 0.87 MG/DL (ref 0.6–1.1)
ERYTHROCYTE [DISTWIDTH] IN BLOOD BY AUTOMATED COUNT: 14.3 % (ref 11.9–14.6)
EST. AVERAGE GLUCOSE BLD GHB EST-MCNC: 143 MG/DL
GLOBULIN SER CALC-MCNC: 3.3 G/DL (ref 2.3–3.5)
GLUCOSE SERPL-MCNC: 119 MG/DL (ref 70–99)
HBA1C MFR BLD: 6.6 % (ref 0–5.6)
HCT VFR BLD AUTO: 43.7 % (ref 35.8–46.3)
HDLC SERPL-MCNC: 68 MG/DL (ref 40–60)
HDLC SERPL: 3.3 (ref 0–5)
HGB BLD-MCNC: 14.4 G/DL (ref 11.7–15.4)
LDLC SERPL CALC-MCNC: 135 MG/DL (ref 0–100)
MCH RBC QN AUTO: 28.7 PG (ref 26.1–32.9)
MCHC RBC AUTO-ENTMCNC: 33 G/DL (ref 31.4–35)
MCV RBC AUTO: 87.2 FL (ref 82–102)
NRBC # BLD: 0 K/UL (ref 0–0.2)
PLATELET # BLD AUTO: 318 K/UL (ref 150–450)
PMV BLD AUTO: 10.3 FL (ref 9.4–12.3)
POTASSIUM SERPL-SCNC: 4.6 MMOL/L (ref 3.5–5.1)
PROT SERPL-MCNC: 7 G/DL (ref 6.3–8.2)
RBC # BLD AUTO: 5.01 M/UL (ref 4.05–5.2)
SODIUM SERPL-SCNC: 137 MMOL/L (ref 136–145)
TRIGL SERPL-MCNC: 90 MG/DL (ref 0–150)
TSH, 3RD GENERATION: 1.33 UIU/ML (ref 0.27–4.2)
VLDLC SERPL CALC-MCNC: 18 MG/DL (ref 6–23)
WBC # BLD AUTO: 6.2 K/UL (ref 4.3–11.1)

## 2025-05-17 SDOH — ECONOMIC STABILITY: FOOD INSECURITY: WITHIN THE PAST 12 MONTHS, THE FOOD YOU BOUGHT JUST DIDN'T LAST AND YOU DIDN'T HAVE MONEY TO GET MORE.: NEVER TRUE

## 2025-05-17 SDOH — ECONOMIC STABILITY: FOOD INSECURITY: WITHIN THE PAST 12 MONTHS, YOU WORRIED THAT YOUR FOOD WOULD RUN OUT BEFORE YOU GOT MONEY TO BUY MORE.: NEVER TRUE

## 2025-05-17 SDOH — HEALTH STABILITY: PHYSICAL HEALTH: ON AVERAGE, HOW MANY MINUTES DO YOU ENGAGE IN EXERCISE AT THIS LEVEL?: 30 MIN

## 2025-05-17 SDOH — ECONOMIC STABILITY: INCOME INSECURITY: IN THE LAST 12 MONTHS, WAS THERE A TIME WHEN YOU WERE NOT ABLE TO PAY THE MORTGAGE OR RENT ON TIME?: NO

## 2025-05-17 SDOH — HEALTH STABILITY: PHYSICAL HEALTH: ON AVERAGE, HOW MANY DAYS PER WEEK DO YOU ENGAGE IN MODERATE TO STRENUOUS EXERCISE (LIKE A BRISK WALK)?: 5 DAYS

## 2025-05-17 SDOH — ECONOMIC STABILITY: TRANSPORTATION INSECURITY
IN THE PAST 12 MONTHS, HAS THE LACK OF TRANSPORTATION KEPT YOU FROM MEDICAL APPOINTMENTS OR FROM GETTING MEDICATIONS?: NO

## 2025-05-17 ASSESSMENT — PATIENT HEALTH QUESTIONNAIRE - PHQ9
SUM OF ALL RESPONSES TO PHQ QUESTIONS 1-9: 0
SUM OF ALL RESPONSES TO PHQ QUESTIONS 1-9: 0
1. LITTLE INTEREST OR PLEASURE IN DOING THINGS: NOT AT ALL
SUM OF ALL RESPONSES TO PHQ QUESTIONS 1-9: 0
2. FEELING DOWN, DEPRESSED OR HOPELESS: NOT AT ALL
SUM OF ALL RESPONSES TO PHQ QUESTIONS 1-9: 0

## 2025-05-17 ASSESSMENT — LIFESTYLE VARIABLES
HOW OFTEN DO YOU HAVE A DRINK CONTAINING ALCOHOL: 2
HOW MANY STANDARD DRINKS CONTAINING ALCOHOL DO YOU HAVE ON A TYPICAL DAY: 1
HOW MANY STANDARD DRINKS CONTAINING ALCOHOL DO YOU HAVE ON A TYPICAL DAY: 1 OR 2
HOW OFTEN DO YOU HAVE A DRINK CONTAINING ALCOHOL: MONTHLY OR LESS
HOW OFTEN DO YOU HAVE SIX OR MORE DRINKS ON ONE OCCASION: 1

## 2025-05-20 ENCOUNTER — OFFICE VISIT (OUTPATIENT)
Dept: INTERNAL MEDICINE CLINIC | Facility: CLINIC | Age: 70
End: 2025-05-20
Payer: MEDICARE

## 2025-05-20 VITALS
SYSTOLIC BLOOD PRESSURE: 124 MMHG | OXYGEN SATURATION: 95 % | HEIGHT: 67 IN | WEIGHT: 193 LBS | BODY MASS INDEX: 30.29 KG/M2 | HEART RATE: 77 BPM | TEMPERATURE: 97.7 F | DIASTOLIC BLOOD PRESSURE: 80 MMHG

## 2025-05-20 DIAGNOSIS — Z85.3 HISTORY OF CANCER OF LEFT BREAST: ICD-10-CM

## 2025-05-20 DIAGNOSIS — Z00.00 MEDICARE ANNUAL WELLNESS VISIT, SUBSEQUENT: Primary | ICD-10-CM

## 2025-05-20 DIAGNOSIS — E07.9 THYROID DISEASE: ICD-10-CM

## 2025-05-20 DIAGNOSIS — Z78.0 POST-MENOPAUSE: ICD-10-CM

## 2025-05-20 DIAGNOSIS — C44.301 MALIGNANT NEOPLASM OF SKIN OF NOSE: ICD-10-CM

## 2025-05-20 DIAGNOSIS — F41.1 ANXIETY STATE: ICD-10-CM

## 2025-05-20 DIAGNOSIS — E55.9 VITAMIN D DEFICIENCY, UNSPECIFIED: ICD-10-CM

## 2025-05-20 DIAGNOSIS — R73.03 PREDIABETES: ICD-10-CM

## 2025-05-20 DIAGNOSIS — Z12.11 COLON CANCER SCREENING: ICD-10-CM

## 2025-05-20 DIAGNOSIS — I10 ESSENTIAL HYPERTENSION, BENIGN: ICD-10-CM

## 2025-05-20 DIAGNOSIS — M85.80 OSTEOPENIA DUE TO CANCER THERAPY: ICD-10-CM

## 2025-05-20 DIAGNOSIS — D03.62 MELANOMA IN SITU OF LEFT UPPER EXTREMITY INCLUDING SHOULDER (HCC): ICD-10-CM

## 2025-05-20 PROCEDURE — G0439 PPPS, SUBSEQ VISIT: HCPCS | Performed by: INTERNAL MEDICINE

## 2025-05-20 PROCEDURE — G2211 COMPLEX E/M VISIT ADD ON: HCPCS | Performed by: INTERNAL MEDICINE

## 2025-05-20 PROCEDURE — 1123F ACP DISCUSS/DSCN MKR DOCD: CPT | Performed by: INTERNAL MEDICINE

## 2025-05-20 PROCEDURE — 1159F MED LIST DOCD IN RCRD: CPT | Performed by: INTERNAL MEDICINE

## 2025-05-20 PROCEDURE — 3074F SYST BP LT 130 MM HG: CPT | Performed by: INTERNAL MEDICINE

## 2025-05-20 PROCEDURE — 99214 OFFICE O/P EST MOD 30 MIN: CPT | Performed by: INTERNAL MEDICINE

## 2025-05-20 PROCEDURE — 3079F DIAST BP 80-89 MM HG: CPT | Performed by: INTERNAL MEDICINE

## 2025-05-20 RX ORDER — BENAZEPRIL HYDROCHLORIDE AND HYDROCHLOROTHIAZIDE 20; 12.5 MG/1; MG/1
1 TABLET ORAL DAILY
Qty: 90 TABLET | Refills: 3 | Status: SHIPPED | OUTPATIENT
Start: 2025-05-20

## 2025-05-20 RX ORDER — LORAZEPAM 0.5 MG/1
0.5 TABLET ORAL EVERY 6 HOURS PRN
Qty: 90 TABLET | Refills: 1 | Status: SHIPPED | OUTPATIENT
Start: 2025-05-20 | End: 2025-08-18

## 2025-05-20 RX ORDER — LEVOTHYROXINE SODIUM 75 UG/1
75 TABLET ORAL
Qty: 90 TABLET | Refills: 3 | Status: SHIPPED | OUTPATIENT
Start: 2025-05-20

## 2025-05-20 RX ORDER — LIOTHYRONINE SODIUM 5 UG/1
5 TABLET ORAL DAILY
Qty: 90 TABLET | Refills: 3 | Status: SHIPPED | OUTPATIENT
Start: 2025-05-20

## 2025-05-20 RX ORDER — PETROLATUM,WHITE/LANOLIN
OINTMENT (GRAM) TOPICAL
COMMUNITY
Start: 2024-11-11

## 2025-05-20 ASSESSMENT — LIFESTYLE VARIABLES
HOW OFTEN DO YOU HAVE A DRINK CONTAINING ALCOHOL: MONTHLY OR LESS
HOW MANY STANDARD DRINKS CONTAINING ALCOHOL DO YOU HAVE ON A TYPICAL DAY: 1 OR 2

## 2025-05-20 NOTE — ASSESSMENT & PLAN NOTE
Monitored by specialist- no acute findings meriting change in the plan  Basal cell carcinoma.  - Scheduled for Mohs surgery on her nose in 07/2025 to remove the basal cell carcinoma.  - The procedure will involve local anesthesia and may take a couple of hours to ensure complete removal.  - The patient has been informed about the nature of the procedure and the expected recovery process.  - Follow-up with the dermatologist post-surgery to monitor healing and any potential recurrence.

## 2025-05-20 NOTE — ASSESSMENT & PLAN NOTE
Monitored by specialist- no acute findings meriting change in the plan  - A comprehensive picture of the melanoma site was obtained for documentation purposes.  - Continues to follow up with her dermatologist every 3 months for monitoring.  - No new melanoma lesions have been identified since the last excision in 03/2024.  - Regular dermatological evaluations will continue to ensure early detection and management.

## 2025-05-20 NOTE — ASSESSMENT & PLAN NOTE
Monitored by specialist- no acute findings meriting change in the plan    Date of last Mammogram: 11/25/2024

## 2025-05-20 NOTE — ASSESSMENT & PLAN NOTE
Date of last DEXA: 11/25/2024    Avoid heavy lifting to prevent pathologic fracture risk.     English

## 2025-05-20 NOTE — ASSESSMENT & PLAN NOTE
Treatment regimen is effective.       Lab Results   Component Value Date    TSH 1.330 05/15/2025     Continue Synthroid 75 mcg daily.    Medication refilled    Follow up lab in six months.

## 2025-05-20 NOTE — PROGRESS NOTES
Medication Sig Taking? Authorizing Provider   Glucosamine Sulfate 1000 MG CAPS  Yes Esteban Wilkes MD   LORazepam (ATIVAN) 0.5 MG tablet Take 1 tablet by mouth every 6 hours as needed for Anxiety for up to 90 days. Max Daily Amount: 2 mg Yes Dov Moctezuma MD   liothyronine (CYTOMEL) 5 MCG tablet Take 1 tablet by mouth daily Yes Dov Moctezuma MD   levothyroxine (SYNTHROID) 75 MCG tablet Take 1 tablet by mouth every morning (before breakfast) Yes Dov Moctezuma MD   benazepril-hydrochlorthiazide (LOTENSIN HCT) 20-12.5 MG per tablet Take 1 tablet by mouth daily Yes Dov Moctezuma MD   Omega-3 Fatty Acids (FISH OIL) 1000 MG capsule  Yes Esteban Wilkes MD   Saccharomyces boulardii (PROBIOTIC) 250 MG CAPS  Yes Esteban Wilkes MD   Berberine Chloride 500 MG CAPS  Yes Esteban Wilkes MD   Multiple Vitamins-Minerals (AIRBORNE PO) Take by mouth daily Yes Esteban Wilkes MD   VITAMIN D PO Take by mouth Daily Yes Esteban Wilkes MD   CALCIUM PO Take by mouth daily Yes Esteban Wilkes MD   Ascorbic Acid (VITAMIN C PO) Take by mouth daily Yes Esteban Wilkes MD   omeprazole (PRILOSEC) 20 MG delayed release capsule Take by mouth as needed Yes Esteban Wilkes MD   diphenhydrAMINE (BENADRYL) 25 MG capsule Take 2 capsules by mouth nightly as needed Yes Automatic Reconciliation, Ar       CareTeam (Including outside providers/suppliers regularly involved in providing care):   Patient Care Team:  Dov Moctezuma MD as PCP - General  Dov Moctezuma MD as PCP - Empaneled Provider     Recommendations for Preventive Services Due: see orders and patient instructions/AVS.  Recommended screening schedule for the next 5-10 years is provided to the patient in written form: see Patient Instructions/AVS.     Reviewed and updated this visit:  Tobacco  Allergies  Meds  Problems  Med Hx  Surg Hx  Fam Hx  Sexual   Hx

## 2025-05-20 NOTE — ASSESSMENT & PLAN NOTE
The patient is asked to make an attempt to improve diet and exercise patterns to aid in medical management of this problem.  See patient instructions.      Lab Results   Component Value Date/Time     05/15/2025 07:56 AM    K 4.6 05/15/2025 07:56 AM     05/15/2025 07:56 AM    CO2 25 05/15/2025 07:56 AM    BUN 17 05/15/2025 07:56 AM    CREATININE 0.87 05/15/2025 07:56 AM    GLUCOSE 119 05/15/2025 07:56 AM    CALCIUM 9.6 05/15/2025 07:56 AM    LABGLOM 73 05/15/2025 07:56 AM    LABGLOM 55 11/09/2023 08:19 AM    LABGLOM 77 04/21/2022 11:22 AM      Hemoglobin A1C   Date Value Ref Range Status   05/15/2025 6.6 (H) 0 - 5.6 % Final     Comment:     Reference Range  Normal       <5.7%  Prediabetes  5.7-6.4%  Diabetes     >6.4%       Hemoglobin A1C, POC   Date Value Ref Range Status   10/28/2021 6.3 % Final

## 2025-05-20 NOTE — ASSESSMENT & PLAN NOTE
Treatment regimen is effective.       Lab Results   Component Value Date/Time     05/15/2025 07:56 AM    K 4.6 05/15/2025 07:56 AM     05/15/2025 07:56 AM    CO2 25 05/15/2025 07:56 AM    BUN 17 05/15/2025 07:56 AM    CREATININE 0.87 05/15/2025 07:56 AM    GLUCOSE 119 05/15/2025 07:56 AM    CALCIUM 9.6 05/15/2025 07:56 AM    LABGLOM 73 05/15/2025 07:56 AM    LABGLOM 55 11/09/2023 08:19 AM    LABGLOM 77 04/21/2022 11:22 AM      Hypertension Medications       Antihypertensive Combinations       benazepril-hydrochlorthiazide (LOTENSIN HCT) 20-12.5 MG per tablet Take 1 tablet by mouth daily

## 2025-06-12 ENCOUNTER — OFFICE VISIT (OUTPATIENT)
Dept: INTERNAL MEDICINE CLINIC | Facility: CLINIC | Age: 70
End: 2025-06-12
Payer: MEDICARE

## 2025-06-12 VITALS
BODY MASS INDEX: 30.45 KG/M2 | DIASTOLIC BLOOD PRESSURE: 78 MMHG | OXYGEN SATURATION: 96 % | HEART RATE: 82 BPM | TEMPERATURE: 97.2 F | WEIGHT: 194 LBS | HEIGHT: 67 IN | SYSTOLIC BLOOD PRESSURE: 118 MMHG

## 2025-06-12 DIAGNOSIS — Z01.818 PRE-OPERATIVE CLEARANCE: Primary | ICD-10-CM

## 2025-06-12 DIAGNOSIS — Z01.818 PRE-OP EXAM: Primary | ICD-10-CM

## 2025-06-12 DIAGNOSIS — E07.9 THYROID DISEASE: ICD-10-CM

## 2025-06-12 DIAGNOSIS — I10 ESSENTIAL HYPERTENSION, BENIGN: ICD-10-CM

## 2025-06-12 DIAGNOSIS — H25.9 SENILE CATARACT OF RIGHT EYE, UNSPECIFIED AGE-RELATED CATARACT TYPE: ICD-10-CM

## 2025-06-12 PROCEDURE — 99213 OFFICE O/P EST LOW 20 MIN: CPT | Performed by: NURSE PRACTITIONER

## 2025-06-12 PROCEDURE — G2211 COMPLEX E/M VISIT ADD ON: HCPCS | Performed by: NURSE PRACTITIONER

## 2025-06-12 PROCEDURE — 1159F MED LIST DOCD IN RCRD: CPT | Performed by: NURSE PRACTITIONER

## 2025-06-12 PROCEDURE — 1123F ACP DISCUSS/DSCN MKR DOCD: CPT | Performed by: NURSE PRACTITIONER

## 2025-06-12 PROCEDURE — 93000 ELECTROCARDIOGRAM COMPLETE: CPT | Performed by: NURSE PRACTITIONER

## 2025-06-12 PROCEDURE — 3078F DIAST BP <80 MM HG: CPT | Performed by: NURSE PRACTITIONER

## 2025-06-12 PROCEDURE — 3074F SYST BP LT 130 MM HG: CPT | Performed by: NURSE PRACTITIONER

## 2025-06-12 PROCEDURE — 1160F RVW MEDS BY RX/DR IN RCRD: CPT | Performed by: NURSE PRACTITIONER

## 2025-06-12 ASSESSMENT — ENCOUNTER SYMPTOMS
EYE ITCHING: 0
WHEEZING: 0
SHORTNESS OF BREATH: 0
DIARRHEA: 0
BLOOD IN STOOL: 0
CONSTIPATION: 0
EYE DISCHARGE: 0
COUGH: 0
ABDOMINAL PAIN: 0
EYE PAIN: 0
NAUSEA: 0
EYE REDNESS: 0
SORE THROAT: 0
RHINORRHEA: 0
VOMITING: 0

## 2025-06-12 NOTE — PROGRESS NOTES
07:56 AM       Lab Results   Component Value Date/Time    CHOL 221 05/15/2025 07:56 AM    HDL 68 05/15/2025 07:56 AM     05/15/2025 07:56 AM    .2 11/09/2023 08:19 AM    TRIG 90 05/15/2025 07:56 AM    VLDL 18 05/15/2025 07:56 AM    VLDL 17 04/21/2022 11:22 AM       Lab Results   Component Value Date/Time    LABA1C 6.6 05/15/2025 07:56 AM    LABA1C 6.7 11/14/2024 08:15 AM    LABA1C 6.0 11/09/2023 08:19 AM    LABA1C 6.0 05/03/2023 08:04 AM    LABA1C 6.1 11/01/2022 08:41 AM       Lab Results   Component Value Date/Time    TSH 1.330 05/15/2025 07:56 AM    TSH 1.700 05/09/2024 08:15 AM    TSH 1.600 11/09/2023 08:19 AM       No results found for: \"PSA\"    No results found for: \"SPECGRAV\", \"PHUR\", \"COLORU\", \"CLARITYU\", \"LEUKOCYTESUR\", \"PROTEINU\", \"GLUCOSEU\", \"KETUA\", \"BLOODU\", \"BILIRUBINUR\", \"UROBILINOGEN\", \"NITRU\"        Assessment/Plan:    Dayna was seen today for surgical clearance.    Diagnoses and all orders for this visit:    Pre-op exam  -     EKG 12 Lead    I, Krystina Almanza NP, have examined this patient, have checked all appropriate lab work and tests and certify that, to the best of my knowledge, there is not a medical contraindication for undergoing elective surgery.     Senile cataract of right eye, unspecified age-related cataract type    Essential hypertension, benign  Well-controlled on current regimen.     Thyroid disease  Well-controlled on current regimen.         Follow-up and Dispositions    Return for NEXT SCHEDULED ROUTINE FOLLOW-UP; SOONER IF NEEDED.         On this date, 6/12/25, I have spent 20 minutes reviewing previous notes, test results and face to face with the patient discussing the diagnosis and importance of compliance with the treatment plan as well as documenting on the day of the visit.     An electronic signature was used to authenticate this note.  -ANDREW TrinhC

## (undated) DEVICE — BETADINE 5% EYE SOL

## (undated) DEVICE — ADMINISTRATION SET IV SINGLE FLUID 80 IN IRRIGATION KT

## (undated) DEVICE — 25+® REVOLUTION DSP ILM FORCEPS: Brand: ALCON GRIESHABER REVOLUTION 25+

## (undated) DEVICE — DISPOSABLE BIPOLAR CODE, 12' (3.66 M): Brand: CONMED

## (undated) DEVICE — SURGICAL PROCEDURE PACK EYE CDS

## (undated) DEVICE — SOLUTION IRRIGATION BAL SALT SOLUTION 500 ML STRL BSS

## (undated) DEVICE — Z DISCONTINUED NEEDLE HYPO 27GA L1.25IN GRY POLYPR HUB S STL REG BVL STR

## (undated) DEVICE — DIATHERMY PROBE DSP, 25G: Brand: ALCON GRIESHABER

## (undated) DEVICE — SOLUTION IRRIGATION BAL SALT SOLUTION 500 ML BTL 6/CA BSS +

## (undated) DEVICE — CANNULA OPHTH 2 BOR 25 GA 0.5 MM SS

## (undated) DEVICE — 3M™ TEGADERM™ TRANSPARENT FILM DRESSING FRAME STYLE, 1628, 6 IN X 8 IN (15 CM X 20 CM), 10/CT 8CT/CASE: Brand: 3M™ TEGADERM™

## (undated) DEVICE — SUTURE PLN GUT SZ 6-0 L18IN ABSRB YELLOWISH TAN L6.5MM 1735G

## (undated) DEVICE — ADVANCED DSP BACKFLUSH BLUNT, 25G: Brand: ALCON GRIESHABER

## (undated) DEVICE — GLOVE SURG SZ 85 CRM LTX FREE POLYISOPRENE POLYMER BEAD ANTI

## (undated) DEVICE — MVR KNIFE 25 GAUGE: Brand: ALCON

## (undated) DEVICE — CONSTELLATION VISION SYSTEM 7500 CPM ULTRAVIT PROBE STRAIGHT ENDOILLUMINATOR 25+ TOTALPLUS VITRECTOMY PAK EDGEPLUS BLADE VALVED ENTRY SYSTEM: Brand: CONSTELLATION, ULTRAVIT, 25+, TOTALPLUS, EDGEPLUS